# Patient Record
Sex: FEMALE | Race: WHITE | Employment: OTHER | URBAN - METROPOLITAN AREA
[De-identification: names, ages, dates, MRNs, and addresses within clinical notes are randomized per-mention and may not be internally consistent; named-entity substitution may affect disease eponyms.]

---

## 2017-04-06 PROBLEM — L03.115 CELLULITIS OF RIGHT LOWER EXTREMITY: Status: ACTIVE | Noted: 2017-04-06

## 2017-04-06 PROBLEM — L03.90 CELLULITIS: Status: ACTIVE | Noted: 2017-04-06

## 2017-04-06 PROBLEM — I50.9 CHF (CONGESTIVE HEART FAILURE) (HCC): Status: ACTIVE | Noted: 2017-04-06

## 2017-04-06 PROBLEM — A41.9 SEVERE SEPSIS (HCC): Status: ACTIVE | Noted: 2017-04-06

## 2017-04-06 PROBLEM — R65.20 SEVERE SEPSIS (HCC): Status: ACTIVE | Noted: 2017-04-06

## 2017-11-16 ENCOUNTER — HOSPITAL ENCOUNTER (OUTPATIENT)
Dept: MAMMOGRAPHY | Age: 57
Discharge: HOME | End: 2017-11-16
Attending: FAMILY MEDICINE | Admitting: FAMILY MEDICINE

## 2017-11-16 DIAGNOSIS — R92.8 ABNORMAL MAMMOGRAM: ICD-10-CM

## 2017-11-16 DIAGNOSIS — N63.0 LUMP OR MASS IN BREAST: ICD-10-CM

## 2017-11-16 DIAGNOSIS — N63.0 BREAST LUMP: ICD-10-CM

## 2018-01-23 PROBLEM — K43.0 INCARCERATED INCISIONAL HERNIA: Status: ACTIVE | Noted: 2018-01-23

## 2018-01-24 PROBLEM — E11.9 DIABETES (HCC): Status: ACTIVE | Noted: 2018-01-24

## 2018-01-24 PROBLEM — J44.9 COPD (CHRONIC OBSTRUCTIVE PULMONARY DISEASE) (HCC): Status: ACTIVE | Noted: 2018-01-24

## 2018-01-24 PROBLEM — I10 HTN (HYPERTENSION): Status: ACTIVE | Noted: 2018-01-24

## 2018-01-24 PROBLEM — E66.9 OBESITY: Status: ACTIVE | Noted: 2018-01-24

## 2018-02-05 ENCOUNTER — TELEPHONE (OUTPATIENT)
Dept: PULMONOLOGY | Age: 58
End: 2018-02-05

## 2018-02-09 ENCOUNTER — OFFICE VISIT (OUTPATIENT)
Dept: SURGERY | Age: 58
End: 2018-02-09

## 2018-02-09 VITALS
BODY MASS INDEX: 53.92 KG/M2 | HEART RATE: 86 BPM | HEIGHT: 62 IN | DIASTOLIC BLOOD PRESSURE: 85 MMHG | SYSTOLIC BLOOD PRESSURE: 132 MMHG | WEIGHT: 293 LBS

## 2018-02-09 DIAGNOSIS — Z09 POSTOP CHECK: ICD-10-CM

## 2018-02-09 PROCEDURE — 99024 POSTOP FOLLOW-UP VISIT: CPT | Performed by: SURGERY

## 2018-02-20 ENCOUNTER — OFFICE VISIT (OUTPATIENT)
Dept: PULMONOLOGY | Age: 58
End: 2018-02-20

## 2018-02-20 VITALS
RESPIRATION RATE: 18 BRPM | HEART RATE: 72 BPM | OXYGEN SATURATION: 97 % | DIASTOLIC BLOOD PRESSURE: 74 MMHG | SYSTOLIC BLOOD PRESSURE: 161 MMHG | BODY MASS INDEX: 53.92 KG/M2 | TEMPERATURE: 98.1 F | WEIGHT: 293 LBS | HEIGHT: 62 IN

## 2018-02-20 DIAGNOSIS — J45.20 MILD INTERMITTENT ASTHMA WITHOUT COMPLICATION: ICD-10-CM

## 2018-02-20 DIAGNOSIS — E66.01 MORBID OBESITY (HCC): ICD-10-CM

## 2018-02-20 DIAGNOSIS — E66.2 OBESITY HYPOVENTILATION SYNDROME (HCC): ICD-10-CM

## 2018-02-20 DIAGNOSIS — J44.9 CHRONIC OBSTRUCTIVE PULMONARY DISEASE, UNSPECIFIED COPD TYPE (HCC): Primary | ICD-10-CM

## 2018-02-20 DIAGNOSIS — R06.02 SOB (SHORTNESS OF BREATH): ICD-10-CM

## 2018-02-20 DIAGNOSIS — G47.33 OSA (OBSTRUCTIVE SLEEP APNEA): ICD-10-CM

## 2018-02-20 PROCEDURE — 3023F SPIROM DOC REV: CPT | Performed by: INTERNAL MEDICINE

## 2018-02-20 PROCEDURE — 1036F TOBACCO NON-USER: CPT | Performed by: INTERNAL MEDICINE

## 2018-02-20 PROCEDURE — 1111F DSCHRG MED/CURRENT MED MERGE: CPT | Performed by: INTERNAL MEDICINE

## 2018-02-20 PROCEDURE — 3017F COLORECTAL CA SCREEN DOC REV: CPT | Performed by: INTERNAL MEDICINE

## 2018-02-20 PROCEDURE — G8427 DOCREV CUR MEDS BY ELIG CLIN: HCPCS | Performed by: INTERNAL MEDICINE

## 2018-02-20 PROCEDURE — G8484 FLU IMMUNIZE NO ADMIN: HCPCS | Performed by: INTERNAL MEDICINE

## 2018-02-20 PROCEDURE — G8926 SPIRO NO PERF OR DOC: HCPCS | Performed by: INTERNAL MEDICINE

## 2018-02-20 PROCEDURE — G8417 CALC BMI ABV UP PARAM F/U: HCPCS | Performed by: INTERNAL MEDICINE

## 2018-02-20 PROCEDURE — 99214 OFFICE O/P EST MOD 30 MIN: CPT | Performed by: INTERNAL MEDICINE

## 2018-02-20 PROCEDURE — 3014F SCREEN MAMMO DOC REV: CPT | Performed by: INTERNAL MEDICINE

## 2018-02-20 ASSESSMENT — SLEEP AND FATIGUE QUESTIONNAIRES
HOW LIKELY ARE YOU TO NOD OFF OR FALL ASLEEP WHILE WATCHING TV: 3
HOW LIKELY ARE YOU TO NOD OFF OR FALL ASLEEP WHILE SITTING INACTIVE IN A PUBLIC PLACE: 2
HOW LIKELY ARE YOU TO NOD OFF OR FALL ASLEEP WHILE LYING DOWN TO REST IN THE AFTERNOON WHEN CIRCUMSTANCES PERMIT: 2
HOW LIKELY ARE YOU TO NOD OFF OR FALL ASLEEP WHILE SITTING QUIETLY AFTER LUNCH WITHOUT ALCOHOL: 1
HOW LIKELY ARE YOU TO NOD OFF OR FALL ASLEEP WHILE SITTING AND TALKING TO SOMEONE: 1
HOW LIKELY ARE YOU TO NOD OFF OR FALL ASLEEP WHEN YOU ARE A PASSENGER IN A CAR FOR AN HOUR WITHOUT A BREAK: 0
ESS TOTAL SCORE: 12
HOW LIKELY ARE YOU TO NOD OFF OR FALL ASLEEP IN A CAR, WHILE STOPPED FOR A FEW MINUTES IN TRAFFIC: 0
NECK CIRCUMFERENCE (INCHES): 16.25
HOW LIKELY ARE YOU TO NOD OFF OR FALL ASLEEP WHILE SITTING AND READING: 3

## 2018-02-20 ASSESSMENT — ENCOUNTER SYMPTOMS
DIARRHEA: 1
SHORTNESS OF BREATH: 1

## 2018-02-20 NOTE — LETTER
[address] [facility] Pulmonary Critical [facility] Sleep  [address] [place] [address] Drive [address]  Phone: [phone]  Fax: [phone]    Ortega Haywood DO        February 20, 2018       Patient: Michael Garcia   MR Number: [id number]   YOB: 1960   Date of Visit: 2/20/2018       Dear Dr. Chirag Mott: Our mutual patient Michael Garcia was seen in hospital follow-up today. Below are the relevant portions of my assessment and plan of care. If you have questions, please do not hesitate to call me. I look forward to following Merle Lombardo along with you.     Sincerely,        Flex Red MD    CC providers:  Amanda Amaya DO  [address] [place] [address]  VIA [place]

## 2018-02-20 NOTE — PROGRESS NOTES
Pulmonary Outpatient Note   Will Tijerina MD       2/20/2018    1. Chronic obstructive pulmonary disease, unspecified COPD type ([facility]    2. Mild intermittent asthma without complication    3. MIRIAM (obstructive sleep apnea)    4. SOB (shortness of breath)    5. Obesity hypoventilation syndrome ([facility]    6. Morbid obesity ([facility]          ASSESSMENT/PLAN:  1. COPD, mild intermittent asthma. Patient did not have any heavy smoking history, likely has intermittent asthma rather than COPD. Will obtain PFT, CXR.  2.  MIRIAM. I will start out with overnight oximetry. 3.  SOB. Probably from obesity, deconditioning and potentially asthma. Await PFT. Will need to lose weight and increase activity. The patient was informed about this. 4.  Obesity hypoventilation syndrome. She does not have daytime hypoxemia present, but will await overnight oximetry. 5.  Morbid obesity. She will need to cut back calories, have explained to her that exercise alone will not work. Diet was discussed with her, she may need a formal consult with a nutritionist.  6. ?  CHF, aortic stenosis, TR, pulmonary hypertension. Followed by his cardiologist.  His pulmonary hypertension could be from nocturnal desaturation and/or obesity hypoventilation. RTC after tests completed.       Orders Placed This Encounter   Procedures    XR CHEST STANDARD (2 VW)    Pulse oximetry, overnight    FULL PFT STUDY       No Follow-up on file. Chief Complaint:  Follow-Up from Hospital and COPD       HPI: German Patino is a 56y.o. year old female here for follow up. Her PCP is Julian Briones, surgeon Dr. Casandra Taylor. Her cardiologist is Dr. Tristen You. She is accompanied by her Dori Rowe. Beverly Zuniga is a morbidly obese patient with a history of occasional smoking. She was never documented to have COPD. She was seen during her hospitalization between 1/24 and 1/28/18 for hypoxemic and hypercarbic respiratory failure.   She presented with an incarcerated hernia that was operated upon, developed hypoxemia postoperatively. She gradually recovered, was not discharged on home O2. She has seen Dr. Renee Hernandez in follow-up, staples have been removed on 2/9/18. The patient says she did have possible asthma diagnosed about 15 years ago, was on Advair. Presently she is not on any inhalers. She denies cough or wheezing or chest pain. She has no  complaints. She does have increased frequency of bowel movements, semi-formed. The patient was diagnosed to have sleep apnea more than 10 years ago at [facility]. She did not follow through as she could not tolerate CPAP. She thinks she does have the sleep study at home. The patient said initially postoperatively she was drooling, and symptoms have decreased after surgery. She does have exertional dyspnea, mild. She had lost a significant amount of weight with diet and exercise in the past.  She does see a cardiologist    Previous notes reviewed and edited as necessary. Patient is a 56 y.o. female who was admitted with with incarcerated umbilical hernia with small amount obstruction.      This is a morbidly obese 65-year-old female with past medical history of hypertension, asthma, COPD, diabetes mellitus, arthritis and wound infection from MRSA, details being unknown. She was last admitted to [facility] for right leg cellulitis, sepsis. She had been seen by Dr. Jaime Whyte for sepsis related to cellulitis.     The patient underwent CT abdomen and pelvis which confirmed incarceration. An NG was placed and she has kept nothing by mouth. She was prepped and taken to the OR for hernia repair and mesh placement, returned on the ventilator. No family is available, records have been obtained from EMR and nursing.      Echocardiogram 4/6/17:  Left ventricular systolic function is normal with ejection fraction estimated at 55 %. Left ventricular size is moderately increased. There is mild concentric left ventricular

## 2018-03-06 DIAGNOSIS — G47.33 OSA (OBSTRUCTIVE SLEEP APNEA): ICD-10-CM

## 2018-03-06 DIAGNOSIS — J44.9 CHRONIC OBSTRUCTIVE PULMONARY DISEASE, UNSPECIFIED COPD TYPE (HCC): ICD-10-CM

## 2018-03-19 ENCOUNTER — HOSPITAL ENCOUNTER (OUTPATIENT)
Dept: PULMONOLOGY | Age: 58
Discharge: HOME | End: 2018-03-19
Attending: INTERNAL MEDICINE | Admitting: INTERNAL MEDICINE

## 2018-03-19 ENCOUNTER — OFFICE VISIT (OUTPATIENT)
Dept: PULMONOLOGY | Age: 58
End: 2018-03-19

## 2018-03-19 VITALS
OXYGEN SATURATION: 96 % | HEIGHT: 62 IN | SYSTOLIC BLOOD PRESSURE: 130 MMHG | WEIGHT: 293 LBS | RESPIRATION RATE: 18 BRPM | TEMPERATURE: 98 F | HEART RATE: 76 BPM | BODY MASS INDEX: 53.92 KG/M2 | DIASTOLIC BLOOD PRESSURE: 67 MMHG

## 2018-03-19 DIAGNOSIS — E66.01 MORBID OBESITY (HCC): ICD-10-CM

## 2018-03-19 DIAGNOSIS — G47.33 OSA (OBSTRUCTIVE SLEEP APNEA): ICD-10-CM

## 2018-03-19 DIAGNOSIS — J44.9 CHRONIC OBSTRUCTIVE PULMONARY DISEASE (HCC): ICD-10-CM

## 2018-03-19 DIAGNOSIS — J44.9 CHRONIC OBSTRUCTIVE PULMONARY DISEASE, UNSPECIFIED COPD TYPE (HCC): Primary | ICD-10-CM

## 2018-03-19 DIAGNOSIS — E66.2 OBESITY HYPOVENTILATION SYNDROME (HCC): ICD-10-CM

## 2018-03-19 DIAGNOSIS — J45.20 MILD INTERMITTENT ASTHMA WITHOUT COMPLICATION: ICD-10-CM

## 2018-03-19 DIAGNOSIS — J44.9 CHRONIC OBSTRUCTIVE PULMONARY DISEASE, UNSPECIFIED COPD TYPE (HCC): ICD-10-CM

## 2018-03-19 DIAGNOSIS — R06.02 SOB (SHORTNESS OF BREATH): ICD-10-CM

## 2018-03-19 PROCEDURE — G8926 SPIRO NO PERF OR DOC: HCPCS | Performed by: INTERNAL MEDICINE

## 2018-03-19 PROCEDURE — G8417 CALC BMI ABV UP PARAM F/U: HCPCS | Performed by: INTERNAL MEDICINE

## 2018-03-19 PROCEDURE — 3023F SPIROM DOC REV: CPT | Performed by: INTERNAL MEDICINE

## 2018-03-19 PROCEDURE — 3014F SCREEN MAMMO DOC REV: CPT | Performed by: INTERNAL MEDICINE

## 2018-03-19 PROCEDURE — G8482 FLU IMMUNIZE ORDER/ADMIN: HCPCS | Performed by: INTERNAL MEDICINE

## 2018-03-19 PROCEDURE — G8427 DOCREV CUR MEDS BY ELIG CLIN: HCPCS | Performed by: INTERNAL MEDICINE

## 2018-03-19 PROCEDURE — 3017F COLORECTAL CA SCREEN DOC REV: CPT | Performed by: INTERNAL MEDICINE

## 2018-03-19 PROCEDURE — 99213 OFFICE O/P EST LOW 20 MIN: CPT | Performed by: INTERNAL MEDICINE

## 2018-03-19 PROCEDURE — 1036F TOBACCO NON-USER: CPT | Performed by: INTERNAL MEDICINE

## 2018-03-19 RX ORDER — ALBUTEROL SULFATE 90 UG/1
4 AEROSOL, METERED RESPIRATORY (INHALATION) ONCE
Status: COMPLETED | OUTPATIENT
Start: 2018-03-19 | End: 2018-03-19

## 2018-03-19 RX ADMIN — ALBUTEROL SULFATE 4 PUFF: 90 AEROSOL, METERED RESPIRATORY (INHALATION) at 16:39

## 2018-03-19 ASSESSMENT — ENCOUNTER SYMPTOMS
SHORTNESS OF BREATH: 1
DIARRHEA: 1

## 2018-03-19 NOTE — PROGRESS NOTES
back for follow-up as needed. I encouraged her to increase exercise and lose weight. No orders of the defined types were placed in this encounter. No Follow-up on file. Chief Complaint:  Results (CXR, unable to do PFT) and COPD       HPI: Joy Kent is a 56y.o. year old female here for follow up. Her PCP is Cleopatra Padilla, surgeon Dr. Merle Duggan. Her cardiologist is Dr. Jodee Gould. She is accompanied by her Antolin Curry. The patient says she could not do PFT as she is claustrophobic. She did get overnight oximetry done. Today she walked a lot, is tired in front short of breath. She denies any cough or wheezing. CXR was obtained today. The patient's significant other denies apneas and loud snoring. She did see her cardiologist at [facility] on 2/28. She usually sleeps between 10 and midnight, occasionally watches television in bed. She denies much daytime sleepiness at present. Her labs showed anemia with hemoglobin 9.0. PFT 3/19/18  Spirometry shows no obstruction and no evidence of response to bronchodilator. There are borderline criteria for air trapping, otherwise normal lung volumes. Diffusion capacity was normal.    Previous notes reviewed and edited as necessary. Rachana Monroy is a morbidly obese patient with a history of occasional smoking. She was never documented to have COPD. She was seen during her hospitalization between 1/24 and 1/28/18 for hypoxemic and hypercarbic respiratory failure. She presented with an incarcerated hernia that was operated upon, developed hypoxemia postoperatively. She gradually recovered, was not discharged on home O2. She has seen Dr. Jay Mcclain in follow-up, staples have been removed on 2/9/18. The patient says she did have possible asthma diagnosed about 15 years ago, was on Advair. Presently she is not on any inhalers. She denies cough or wheezing or chest pain. She has no  complaints.   She does have increased frequency of bowel

## 2018-04-11 PROBLEM — Z09 POSTOP CHECK: Status: RESOLVED | Noted: 2018-02-09 | Resolved: 2018-04-11

## 2018-09-17 ENCOUNTER — APPOINTMENT (OUTPATIENT)
Dept: GENERAL RADIOLOGY | Age: 58
DRG: 872 | End: 2018-09-17
Payer: MEDICARE

## 2018-09-17 ENCOUNTER — HOSPITAL ENCOUNTER (INPATIENT)
Age: 58
LOS: 9 days | Discharge: HOME | DRG: 872 | End: 2018-09-26
Attending: EMERGENCY MEDICINE | Admitting: INTERNAL MEDICINE
Payer: MEDICARE

## 2018-09-17 DIAGNOSIS — A41.9 SEPTICEMIA (HCC): ICD-10-CM

## 2018-09-17 DIAGNOSIS — L03.115 CELLULITIS OF RIGHT LOWER EXTREMITY: Primary | ICD-10-CM

## 2018-09-17 LAB
A/G RATIO: 0.9 (ref 1.1–2.2)
ALBUMIN SERPL-MCNC: 3.6 G/DL (ref 3.4–5)
ALP BLD-CCNC: 93 U/L (ref 40–129)
ALT SERPL-CCNC: 22 U/L (ref 10–40)
ANION GAP SERPL CALCULATED.3IONS-SCNC: 16 MMOL/L (ref 3–16)
AST SERPL-CCNC: 87 U/L (ref 15–37)
BASOPHILS ABSOLUTE: 0.2 K/UL (ref 0–0.2)
BASOPHILS RELATIVE PERCENT: 0.8 %
BILIRUB SERPL-MCNC: 0.7 MG/DL (ref 0–1)
BUN BLDV-MCNC: 27 MG/DL (ref 7–20)
CALCIUM SERPL-MCNC: 8.5 MG/DL (ref 8.3–10.6)
CHLORIDE BLD-SCNC: 91 MMOL/L (ref 99–110)
CO2: 22 MMOL/L (ref 21–32)
CREAT SERPL-MCNC: 1.6 MG/DL (ref 0.6–1.1)
EOSINOPHILS ABSOLUTE: 0 K/UL (ref 0–0.6)
EOSINOPHILS RELATIVE PERCENT: 0 %
GFR AFRICAN AMERICAN: 40
GFR NON-AFRICAN AMERICAN: 33
GLOBULIN: 4.2 G/DL
GLUCOSE BLD-MCNC: 146 MG/DL (ref 70–99)
HCT VFR BLD CALC: 31.7 % (ref 36–48)
HEMOGLOBIN: 10.1 G/DL (ref 12–16)
LACTIC ACID: 2.7 MMOL/L (ref 0.4–2)
LIPASE: 32 U/L (ref 13–60)
LYMPHOCYTES ABSOLUTE: 0.7 K/UL (ref 1–5.1)
LYMPHOCYTES RELATIVE PERCENT: 3.4 %
MAGNESIUM: 1.5 MG/DL (ref 1.8–2.4)
MCH RBC QN AUTO: 24.3 PG (ref 26–34)
MCHC RBC AUTO-ENTMCNC: 31.9 G/DL (ref 31–36)
MCV RBC AUTO: 76.2 FL (ref 80–100)
MONOCYTES ABSOLUTE: 0.6 K/UL (ref 0–1.3)
MONOCYTES RELATIVE PERCENT: 3 %
NEUTROPHILS ABSOLUTE: 19.3 K/UL (ref 1.7–7.7)
NEUTROPHILS RELATIVE PERCENT: 92.8 %
PDW BLD-RTO: 16.5 % (ref 12.4–15.4)
PLATELET # BLD: 240 K/UL (ref 135–450)
PMV BLD AUTO: 10.3 FL (ref 5–10.5)
POTASSIUM REFLEX MAGNESIUM: 3.3 MMOL/L (ref 3.5–5.1)
RBC # BLD: 4.15 M/UL (ref 4–5.2)
SODIUM BLD-SCNC: 129 MMOL/L (ref 136–145)
TOTAL PROTEIN: 7.8 G/DL (ref 6.4–8.2)
WBC # BLD: 20.8 K/UL (ref 4–11)

## 2018-09-17 PROCEDURE — 99285 EMERGENCY DEPT VISIT HI MDM: CPT

## 2018-09-17 PROCEDURE — 1200000000 HC SEMI PRIVATE

## 2018-09-17 PROCEDURE — S0028 INJECTION, FAMOTIDINE, 20 MG: HCPCS | Performed by: EMERGENCY MEDICINE

## 2018-09-17 PROCEDURE — 71045 X-RAY EXAM CHEST 1 VIEW: CPT

## 2018-09-17 PROCEDURE — 83735 ASSAY OF MAGNESIUM: CPT

## 2018-09-17 PROCEDURE — 2580000003 HC RX 258: Performed by: EMERGENCY MEDICINE

## 2018-09-17 PROCEDURE — 96360 HYDRATION IV INFUSION INIT: CPT

## 2018-09-17 PROCEDURE — 85025 COMPLETE CBC W/AUTO DIFF WBC: CPT

## 2018-09-17 PROCEDURE — 36415 COLL VENOUS BLD VENIPUNCTURE: CPT

## 2018-09-17 PROCEDURE — 83605 ASSAY OF LACTIC ACID: CPT

## 2018-09-17 PROCEDURE — 87040 BLOOD CULTURE FOR BACTERIA: CPT

## 2018-09-17 PROCEDURE — 80053 COMPREHEN METABOLIC PANEL: CPT

## 2018-09-17 PROCEDURE — 83690 ASSAY OF LIPASE: CPT

## 2018-09-17 PROCEDURE — 6360000002 HC RX W HCPCS: Performed by: EMERGENCY MEDICINE

## 2018-09-17 PROCEDURE — 2500000003 HC RX 250 WO HCPCS: Performed by: EMERGENCY MEDICINE

## 2018-09-17 PROCEDURE — 93005 ELECTROCARDIOGRAM TRACING: CPT | Performed by: EMERGENCY MEDICINE

## 2018-09-17 PROCEDURE — 6370000000 HC RX 637 (ALT 250 FOR IP)

## 2018-09-17 RX ORDER — ACETAMINOPHEN 325 MG/1
TABLET ORAL
Status: COMPLETED
Start: 2018-09-17 | End: 2018-09-17

## 2018-09-17 RX ORDER — 0.9 % SODIUM CHLORIDE 0.9 %
1000 INTRAVENOUS SOLUTION INTRAVENOUS ONCE
Status: COMPLETED | OUTPATIENT
Start: 2018-09-17 | End: 2018-09-18

## 2018-09-17 RX ORDER — SODIUM CHLORIDE 9 MG/ML
INJECTION, SOLUTION INTRAVENOUS CONTINUOUS
Status: DISCONTINUED | OUTPATIENT
Start: 2018-09-17 | End: 2018-09-19

## 2018-09-17 RX ORDER — SODIUM CHLORIDE 0.9 % (FLUSH) 0.9 %
10 SYRINGE (ML) INJECTION PRN
Status: DISCONTINUED | OUTPATIENT
Start: 2018-09-17 | End: 2018-09-26 | Stop reason: HOSPADM

## 2018-09-17 RX ORDER — POTASSIUM CHLORIDE 7.45 MG/ML
10 INJECTION INTRAVENOUS PRN
Status: DISCONTINUED | OUTPATIENT
Start: 2018-09-17 | End: 2018-09-26 | Stop reason: HOSPADM

## 2018-09-17 RX ORDER — SODIUM CHLORIDE 0.9 % (FLUSH) 0.9 %
10 SYRINGE (ML) INJECTION EVERY 12 HOURS SCHEDULED
Status: DISCONTINUED | OUTPATIENT
Start: 2018-09-17 | End: 2018-09-26 | Stop reason: HOSPADM

## 2018-09-17 RX ORDER — 0.9 % SODIUM CHLORIDE 0.9 %
1000 INTRAVENOUS SOLUTION INTRAVENOUS ONCE
Status: COMPLETED | OUTPATIENT
Start: 2018-09-17 | End: 2018-09-17

## 2018-09-17 RX ORDER — POTASSIUM CHLORIDE 20 MEQ/1
40 TABLET, EXTENDED RELEASE ORAL PRN
Status: DISCONTINUED | OUTPATIENT
Start: 2018-09-17 | End: 2018-09-26 | Stop reason: HOSPADM

## 2018-09-17 RX ORDER — POTASSIUM CHLORIDE 20MEQ/15ML
40 LIQUID (ML) ORAL PRN
Status: DISCONTINUED | OUTPATIENT
Start: 2018-09-17 | End: 2018-09-26 | Stop reason: HOSPADM

## 2018-09-17 RX ORDER — FAMOTIDINE 20 MG/1
20 TABLET, FILM COATED ORAL 2 TIMES DAILY
Status: DISCONTINUED | OUTPATIENT
Start: 2018-09-17 | End: 2018-09-26 | Stop reason: HOSPADM

## 2018-09-17 RX ORDER — ACETAMINOPHEN 325 MG/1
650 TABLET ORAL EVERY 4 HOURS PRN
Status: DISCONTINUED | OUTPATIENT
Start: 2018-09-17 | End: 2018-09-18

## 2018-09-17 RX ORDER — ONDANSETRON 2 MG/ML
4 INJECTION INTRAMUSCULAR; INTRAVENOUS EVERY 30 MIN PRN
Status: DISCONTINUED | OUTPATIENT
Start: 2018-09-17 | End: 2018-09-17

## 2018-09-17 RX ORDER — MAGNESIUM SULFATE 1 G/100ML
1 INJECTION INTRAVENOUS ONCE
Status: COMPLETED | OUTPATIENT
Start: 2018-09-17 | End: 2018-09-18

## 2018-09-17 RX ADMIN — ACETAMINOPHEN 650 MG: 325 TABLET ORAL at 23:06

## 2018-09-17 RX ADMIN — FAMOTIDINE 20 MG: 10 INJECTION, SOLUTION INTRAVENOUS at 22:06

## 2018-09-17 RX ADMIN — VANCOMYCIN HYDROCHLORIDE 1000 MG: 1 INJECTION, POWDER, LYOPHILIZED, FOR SOLUTION INTRAVENOUS at 22:59

## 2018-09-17 RX ADMIN — SODIUM CHLORIDE 1000 ML: 9 INJECTION, SOLUTION INTRAVENOUS at 23:00

## 2018-09-17 RX ADMIN — ONDANSETRON 4 MG: 2 INJECTION INTRAMUSCULAR; INTRAVENOUS at 22:06

## 2018-09-17 RX ADMIN — SODIUM CHLORIDE 1000 ML: 9 INJECTION, SOLUTION INTRAVENOUS at 22:06

## 2018-09-17 ASSESSMENT — PAIN DESCRIPTION - PAIN TYPE: TYPE: ACUTE PAIN

## 2018-09-17 ASSESSMENT — PAIN DESCRIPTION - LOCATION: LOCATION: LEG

## 2018-09-17 ASSESSMENT — PAIN SCALES - GENERAL
PAINLEVEL_OUTOF10: 10
PAINLEVEL_OUTOF10: 9

## 2018-09-18 LAB
ANION GAP SERPL CALCULATED.3IONS-SCNC: 14 MMOL/L (ref 3–16)
BASOPHILS ABSOLUTE: 0 K/UL (ref 0–0.2)
BASOPHILS RELATIVE PERCENT: 0.2 %
CHLORIDE BLD-SCNC: 98 MMOL/L (ref 99–110)
CO2: 20 MMOL/L (ref 21–32)
EKG ATRIAL RATE: 92 BPM
EKG DIAGNOSIS: NORMAL
EKG P AXIS: 39 DEGREES
EKG P-R INTERVAL: 128 MS
EKG Q-T INTERVAL: 394 MS
EKG QRS DURATION: 98 MS
EKG QTC CALCULATION (BAZETT): 487 MS
EKG R AXIS: 45 DEGREES
EKG T AXIS: 85 DEGREES
EKG VENTRICULAR RATE: 92 BPM
EOSINOPHILS ABSOLUTE: 0 K/UL (ref 0–0.6)
EOSINOPHILS RELATIVE PERCENT: 0 %
GLUCOSE BLD-MCNC: 114 MG/DL (ref 70–99)
GLUCOSE BLD-MCNC: 133 MG/DL (ref 70–99)
GLUCOSE BLD-MCNC: 164 MG/DL (ref 70–99)
HCT VFR BLD CALC: 29.5 % (ref 36–48)
HEMOGLOBIN: 9.2 G/DL (ref 12–16)
LACTIC ACID, SEPSIS: 1.6 MMOL/L (ref 0.4–1.9)
LACTIC ACID, SEPSIS: 1.6 MMOL/L (ref 0.4–1.9)
LYMPHOCYTES ABSOLUTE: 0.8 K/UL (ref 1–5.1)
LYMPHOCYTES RELATIVE PERCENT: 4.2 %
MCH RBC QN AUTO: 24.2 PG (ref 26–34)
MCHC RBC AUTO-ENTMCNC: 31.1 G/DL (ref 31–36)
MCV RBC AUTO: 78.1 FL (ref 80–100)
MONOCYTES ABSOLUTE: 1 K/UL (ref 0–1.3)
MONOCYTES RELATIVE PERCENT: 5.2 %
NEUTROPHILS ABSOLUTE: 17.4 K/UL (ref 1.7–7.7)
NEUTROPHILS RELATIVE PERCENT: 90.4 %
PDW BLD-RTO: 16.2 % (ref 12.4–15.4)
PERFORMED ON: ABNORMAL
PLATELET # BLD: 174 K/UL (ref 135–450)
PMV BLD AUTO: 9.7 FL (ref 5–10.5)
POTASSIUM REFLEX MAGNESIUM: 3.8 MMOL/L (ref 3.5–5.1)
RBC # BLD: 3.78 M/UL (ref 4–5.2)
SODIUM BLD-SCNC: 132 MMOL/L (ref 136–145)
WBC # BLD: 19.2 K/UL (ref 4–11)

## 2018-09-18 PROCEDURE — 6360000002 HC RX W HCPCS: Performed by: INTERNAL MEDICINE

## 2018-09-18 PROCEDURE — 83605 ASSAY OF LACTIC ACID: CPT

## 2018-09-18 PROCEDURE — 93010 ELECTROCARDIOGRAM REPORT: CPT | Performed by: INTERNAL MEDICINE

## 2018-09-18 PROCEDURE — 6370000000 HC RX 637 (ALT 250 FOR IP): Performed by: INTERNAL MEDICINE

## 2018-09-18 PROCEDURE — 2580000003 HC RX 258: Performed by: INTERNAL MEDICINE

## 2018-09-18 PROCEDURE — 36415 COLL VENOUS BLD VENIPUNCTURE: CPT

## 2018-09-18 PROCEDURE — 83036 HEMOGLOBIN GLYCOSYLATED A1C: CPT

## 2018-09-18 PROCEDURE — 99233 SBSQ HOSP IP/OBS HIGH 50: CPT | Performed by: INTERNAL MEDICINE

## 2018-09-18 PROCEDURE — 85025 COMPLETE CBC W/AUTO DIFF WBC: CPT

## 2018-09-18 PROCEDURE — 1200000000 HC SEMI PRIVATE

## 2018-09-18 PROCEDURE — 2580000003 HC RX 258

## 2018-09-18 PROCEDURE — 80051 ELECTROLYTE PANEL: CPT

## 2018-09-18 RX ORDER — ACETAMINOPHEN 500 MG
1000 TABLET ORAL EVERY 6 HOURS PRN
Status: DISCONTINUED | OUTPATIENT
Start: 2018-09-18 | End: 2018-09-26 | Stop reason: HOSPADM

## 2018-09-18 RX ORDER — FERROUS SULFATE 325(65) MG
325 TABLET ORAL 2 TIMES DAILY WITH MEALS
Status: DISCONTINUED | OUTPATIENT
Start: 2018-09-18 | End: 2018-09-26 | Stop reason: HOSPADM

## 2018-09-18 RX ORDER — HYDROCODONE BITARTRATE AND ACETAMINOPHEN 5; 325 MG/1; MG/1
1 TABLET ORAL EVERY 6 HOURS PRN
Status: DISCONTINUED | OUTPATIENT
Start: 2018-09-18 | End: 2018-09-19

## 2018-09-18 RX ORDER — SODIUM CHLORIDE 9 MG/ML
INJECTION, SOLUTION INTRAVENOUS
Status: COMPLETED
Start: 2018-09-18 | End: 2018-09-18

## 2018-09-18 RX ORDER — MORPHINE SULFATE 2 MG/ML
2 INJECTION, SOLUTION INTRAMUSCULAR; INTRAVENOUS EVERY 4 HOURS PRN
Status: DISCONTINUED | OUTPATIENT
Start: 2018-09-18 | End: 2018-09-26 | Stop reason: HOSPADM

## 2018-09-18 RX ADMIN — SODIUM CHLORIDE: 9 INJECTION, SOLUTION INTRAVENOUS at 00:30

## 2018-09-18 RX ADMIN — SODIUM CHLORIDE: 9 INJECTION, SOLUTION INTRAVENOUS at 20:45

## 2018-09-18 RX ADMIN — FAMOTIDINE 20 MG: 20 TABLET ORAL at 09:53

## 2018-09-18 RX ADMIN — VANCOMYCIN HYDROCHLORIDE 500 MG: 500 INJECTION, POWDER, LYOPHILIZED, FOR SOLUTION INTRAVENOUS at 02:18

## 2018-09-18 RX ADMIN — MAGNESIUM SULFATE HEPTAHYDRATE 1 G: 1 INJECTION, SOLUTION INTRAVENOUS at 00:29

## 2018-09-18 RX ADMIN — HYDROCODONE BITARTRATE AND ACETAMINOPHEN 1 TABLET: 5; 325 TABLET ORAL at 20:42

## 2018-09-18 RX ADMIN — Medication 10 ML: at 20:43

## 2018-09-18 RX ADMIN — SODIUM CHLORIDE: 9 INJECTION, SOLUTION INTRAVENOUS at 02:18

## 2018-09-18 RX ADMIN — AMPICILLIN SODIUM AND SULBACTAM SODIUM 1.5 G: 1; .5 INJECTION, POWDER, FOR SOLUTION INTRAMUSCULAR; INTRAVENOUS at 20:46

## 2018-09-18 RX ADMIN — SODIUM CHLORIDE 250 ML: 9 INJECTION, SOLUTION INTRAVENOUS at 02:19

## 2018-09-18 RX ADMIN — FERROUS SULFATE TAB 325 MG (65 MG ELEMENTAL FE) 325 MG: 325 (65 FE) TAB at 18:05

## 2018-09-18 RX ADMIN — AMPICILLIN SODIUM AND SULBACTAM SODIUM 1.5 G: 1; .5 INJECTION, POWDER, FOR SOLUTION INTRAMUSCULAR; INTRAVENOUS at 12:00

## 2018-09-18 RX ADMIN — HYDROCODONE BITARTRATE AND ACETAMINOPHEN 1 TABLET: 5; 325 TABLET ORAL at 11:57

## 2018-09-18 RX ADMIN — AMPICILLIN SODIUM AND SULBACTAM SODIUM 1.5 G: 1; .5 INJECTION, POWDER, FOR SOLUTION INTRAMUSCULAR; INTRAVENOUS at 18:05

## 2018-09-18 RX ADMIN — FAMOTIDINE 20 MG: 20 TABLET ORAL at 20:43

## 2018-09-18 RX ADMIN — ACETAMINOPHEN 1000 MG: 500 TABLET ORAL at 12:52

## 2018-09-18 RX ADMIN — Medication 10 ML: at 00:30

## 2018-09-18 RX ADMIN — ENOXAPARIN SODIUM 40 MG: 40 INJECTION SUBCUTANEOUS at 09:53

## 2018-09-18 RX ADMIN — FAMOTIDINE 20 MG: 20 TABLET ORAL at 00:29

## 2018-09-18 RX ADMIN — FERROUS SULFATE TAB 325 MG (65 MG ELEMENTAL FE) 325 MG: 325 (65 FE) TAB at 09:53

## 2018-09-18 ASSESSMENT — PAIN SCALES - GENERAL
PAINLEVEL_OUTOF10: 6
PAINLEVEL_OUTOF10: 0
PAINLEVEL_OUTOF10: 6
PAINLEVEL_OUTOF10: 0

## 2018-09-18 ASSESSMENT — PAIN DESCRIPTION - LOCATION
LOCATION: LEG
LOCATION: LEG

## 2018-09-18 ASSESSMENT — PAIN DESCRIPTION - PAIN TYPE
TYPE: ACUTE PAIN
TYPE: ACUTE PAIN

## 2018-09-18 ASSESSMENT — PAIN DESCRIPTION - FREQUENCY: FREQUENCY: INTERMITTENT

## 2018-09-18 ASSESSMENT — PAIN DESCRIPTION - DESCRIPTORS: DESCRIPTORS: ACHING

## 2018-09-18 NOTE — ED PROVIDER NOTES
[address] ED  eMERGENCY dEPARTMENT eNCOUnter        Pt Name: Eric Yoder  MRN: [id number]  [place] 1960  Date of evaluation: 9/17/2018  Provider: Bhaskar Jonas MD  PCP: Sourav Gordon       Chief Complaint   Patient presents with    Emesis     pt states that \"I have a infection in my legs; my body\"; c/o of N/V; pt right leg and foot are swollen       HISTORY OF PRESENT ILLNESS   (Location/Symptom, Timing/Onset, Context/Setting, Quality, Duration, Modifying Factors, Severity)  Note limiting factors. Eric Yoder is a 66 y.o. female  Presents for evaluation of infection in her legs and she feels like it's in the rest of her body she complains of nausea and vomiting right leg is swollen and red    Nursing Notes were all reviewed and agreed with or any disagreements were addressed  in the HPI. REVIEW OF SYSTEMS    (2-9 systems for level 4, 10 or more for level 5)     Review of Systems    Positives and Pertinent negatives as per HPI. Except as noted above in the ROS, all other systems were reviewed and negative. PAST MEDICAL HISTORY     Past Medical History:   Diagnosis Date    Arthritis     Asthma     [facility] obstructive [facility] disease) [facility]     Diabetes mellitus ([facility]     Hypertension     Infection of wound due to methicillin resistant Staphylococcus aureus (MRSA)          SURGICAL HISTORY       Past Surgical History:   Procedure Laterality Date    DILATION AND CURETTAGE OF UTERUS      HERNIA REPAIR  2005    TONSILLECTOMY           CURRENT MEDICATIONS       Previous Medications    FUROSEMIDE (LASIX) 20 MG TABLET    Take 20 mg by mouth daily    TRIAMCINOLONE (ARISTOCORT) 0.5 % OINTMENT    Apply topically 2 times daily as needed Apply topically 2 times daily. ALLERGIES     Asa [aspirin]    FAMILY HISTORY     History reviewed. No pertinent family history.        SOCIAL HISTORY       Social History     Social History    Marital status:      Spouse name: N/A    Number of children: N/A    Years of education: N/A     Social History Main Topics    Smoking status: Former Smoker     Packs/day: 0.25     Years: 3.00     Types: Cigarettes    Smokeless tobacco: Never Used      Comment: 2014    Alcohol use Yes      Comment: rarely    Drug use: No    Sexual activity: Not Asked     Other Topics Concern    None     Social History Narrative    None       SCREENINGS    [place] Coma Scale  Eye Opening: Spontaneous  Best Verbal Response: Oriented  Best Motor Response: Obeys commands  [place] Coma Scale Score: 15        PHYSICAL EXAM    (up to 7 for level 4, 8 or more for level 5)     ED Triage Vitals [09/17/18 2137]   BP Temp Temp Source Pulse Resp SpO2 Height Weight   121/68 100.7 °F (38.2 °C) Oral 93 22 98 % 5' 3\" (1.6 m) (!) 380 lb (172.4 kg)       Physical Exam      General Appearance:  Alert, cooperative, no distress, appears stated age. Head:  Normocephalic, without obvious abnormality, atraumatic. Eyes:  conjunctiva/corneas clear, EOM's intact. Sclera anicteric. ENT: Mucous membranes moist.   Neck: Supple, symmetrical, trachea midline, no adenopathy. No jugular venous distention. Lungs:   Clear to auscultation bilaterally, respirations unlabored. No rales, rhonchi or wheezes. Chest Wall:  No tenderness. Heart:  Regular rate and rhythm, S1 and S2 normal, no murmur, rub or gallop. Abdomen:   Soft, non-tender, bowel sounds active,   no masses, no organomegaly. Extremities: Lipedema bilaterally right leg is erythematous and swollen and warm to touch. Pulses: 2+ and symmetric   Skin: Turgor is normal, no rashes or lesions. Neurologic: Alert and oriented X 3. No focal findings.   Motor grossly normal.  Speech clear, no drift, CN III-XII grossly intact,        DIAGNOSTIC RESULTS   LABS:    Labs Reviewed   CBC WITH AUTO DIFFERENTIAL - Abnormal; Notable for the following:        Result Value    WBC 20.8 (*)

## 2018-09-18 NOTE — ED NOTES
Chief Complaint   Patient presents with    Emesis     pt states that \"I have a infection in my legs; my body\"; c/o of N/V; pt right leg and foot are swollen     Pt placed in room 7 in gown. Placed on cardiac monitor, ST noted. Pt placed on hospital bed upon arrival to ed. Bed in low position, call light in reach. Family at bedside. Will continue to monitor.       Imelda Avila RN  09/17/18 2507

## 2018-09-18 NOTE — PROGRESS NOTES
Alert and oriented x 4. Respirations easy and even. No respiratory distress noted. Pulse regular and non-bounding. Abdomen soft and non-tender. Peripheral pulses present. Patient given hygiene/bed bath by PCA, repositioned in bed. Temp 100.0 oral.monitoring. AM meds given.  Patient denies further needs at this time

## 2018-09-18 NOTE — CONSULTS
Pharmacy Note  Vancomycin Consult    Solange Dee is a 66 y.o. female started on Vancomycin for sepsis/cellulitis ; consult received from Dr. Pooja Goyal to manage therapy. Also receiving the following antibiotics: . Patient Active Problem List   Diagnosis    Cellulitis of right lower extremity    Severe sepsis (HCC)    Congestive heart failure (HCC)    Fever    Incarcerated incisional hernia    Class 3 obesity due to excess calories with serious comorbidity and body mass index (BMI) of 60.0 to 69.9 in adult ([facility]    HTN (hypertension)    Diabetes ([facility]    COPD (chronic obstructive pulmonary disease) ([facility]    Acute postoperative respiratory insufficiency    Aortic valve stenosis    Microcytic anemia    Pulmonary hypertension ([facility]    Morbid obesity ([facility]    MIRIAM (obstructive sleep apnea)    Sepsis [facility]       Allergies:  Asa [aspirin]     Temp max:     Recent Labs      09/17/18   2205   BUN  27*       Recent Labs      09/17/18   2205   CREATININE  1.6*       Recent Labs      09/17/18   2205   WBC  20.8*       No intake or output data in the 24 hours ending 09/17/18 2353    Culture Date      Source                       Results      Ht Readings from Last 1 Encounters:   09/17/18 5' 3\" (1.6 m)        Wt Readings from Last 1 Encounters:   09/17/18 (!) 385 lb 9.6 oz (174.9 kg)         Body mass index is 68.31 kg/m². Estimated Creatinine Clearance: 63 mL/min (A) (based on SCr of 1.6 mg/dL (H)). Goal Trough Level: 15-20 mcg/mL    Assessment/Plan:  Patient received vancomycin 1000mg ivpb x1 dose in ER at 2259 on 9-17-18. Please give additional vancomycin 500mg ivpb x1 dose now (for a total of 1500mg this evening). Then, start vancomycin 1500mg ivpb q24h on 9-19-18 at 0100. Please check vancomycin trough 9-21-18 at [facility]. Thank you for the consult. Will continue to follow. [facility]. Ph.  9/17/2018 11:55 PM

## 2018-09-18 NOTE — H&P
Hypokalemia   MIRIAM     PLAN:  Follow blood cultures   Continue IV vancomycin, pharm to dose  IV fluids, hold diuretics   Replete electrolytes   Reevaluate in the AM.     DVT Prophylaxis: Lovenox   Diet: DIET LOW FAT;  Code Status: Full Code    Dispo - Admit to tele. Thank you Delia Oden for the opportunity to be involved in this patient's care.

## 2018-09-18 NOTE — PLAN OF CARE
Problem: SAFETY  Goal: Free from accidental physical injury  Outcome: Ongoing      Problem: DAILY CARE  Goal: Daily care needs are met  Outcome: Ongoing      Problem: PAIN  Goal: Patient's pain/discomfort is manageable  Outcome: Ongoing      Problem: SKIN INTEGRITY  Goal: Skin integrity is maintained or improved  Outcome: Ongoing      Problem: KNOWLEDGE DEFICIT  Goal: Patient/S.O. demonstrates understanding of disease process, treatment plan, medications, and discharge instructions.   Outcome: Ongoing      Problem: DISCHARGE BARRIERS  Goal: Patient's continuum of care needs are met  Outcome: Ongoing

## 2018-09-18 NOTE — FLOWSHEET NOTE
09/18/18 1430   Vital Signs   Temp 99.1 °F (37.3 °C)   Temp Source Oral   Pulse 76   Heart Rate Source Monitor   Resp 20   BP (!) 100/59   BP Location Right lower arm   BP Upper/Lower Lower   Patient Position Semi fowlers   Level of Consciousness 0   MEWS Score 2   Oxygen Therapy   SpO2 94 %   O2 Device None (Room air)

## 2018-09-18 NOTE — PROGRESS NOTES
IM Progress Note    Admit Date:  9/17/2018  1    Interval history:  Admitted for cellulitis of right leg   High fevers, BP stable s      Subjective:  Ms. Galina Roldan seen up in bed in painful distres. Reports right leg pain , not given any pain meds since last night      Objective:   /73   Pulse 93   Temp 101.4 °F (38.6 °C) (Axillary)   Resp 20   Ht 5' 3\" (1.6 m)   Wt (!) 394 lb 9.6 oz (179 kg)   LMP 09/10/2018   SpO2 98%   BMI 69.90 kg/m²   No intake or output data in the 24 hours ending 09/18/18 0809    Physical Exam:        General: elderly female morbidly obese,   Awake, alert and oriented. Appears to be not in painful distress  Mucous Membranes:  Pink , anicteric  Neck: No JVD, no carotid bruit, no thyromegaly  Chest:  Clear to auscultation bilaterally, no added sounds- diminished  Cardiovascular:  RRR S1S2 heard, no murmurs or gallops  Abdomen:  Soft, morbidly obese,  undistended, non tender, no organomegaly, BS present  Extremities:diffuse ariella pedal edema , abd wall edema with large pannus and intertriginous dermatitis . Severe redeness and warmth to right leg consistent with cellulitis  2+ edema ariella .  Distal pulses well felt  Neurological : grossly normal with mild distress          Medications:   Scheduled Medications:    sodium chloride flush  10 mL Intravenous 2 times per day    enoxaparin  40 mg Subcutaneous Daily    famotidine  20 mg Oral BID    [START ON 9/19/2018] vancomycin  1,500 mg Intravenous Q24H     I   sodium chloride 125 mL/hr at 09/18/18 6883     acetaminophen, sodium chloride flush, potassium chloride **OR** potassium chloride **OR** potassium chloride    Lab Data:  Recent Labs      09/17/18 2205 09/18/18   0542   WBC  20.8*  19.2*   HGB  10.1*  9.2*   HCT  31.7*  29.5*   MCV  76.2*  78.1*   PLT  240  174     Recent Labs      09/17/18 2205 09/18/18   0542   NA  129*  132*   K  3.3*  3.8   CL  91*  98*   CO2  22  20*   BUN  27*   --    CREATININE  1.6*   --      No

## 2018-09-19 LAB
AMORPHOUS: ABNORMAL /HPF
ANION GAP SERPL CALCULATED.3IONS-SCNC: 11 MMOL/L (ref 3–16)
BACTERIA: ABNORMAL /HPF
BASOPHILS ABSOLUTE: 0.1 K/UL (ref 0–0.2)
BASOPHILS RELATIVE PERCENT: 0.7 %
BILIRUBIN URINE: NEGATIVE
BLOOD, URINE: ABNORMAL
BUN BLDV-MCNC: 24 MG/DL (ref 7–20)
CALCIUM SERPL-MCNC: 7.8 MG/DL (ref 8.3–10.6)
CASTS 2: ABNORMAL /LPF
CHLORIDE BLD-SCNC: 99 MMOL/L (ref 99–110)
CLARITY: CLEAR
CO2: 23 MMOL/L (ref 21–32)
COLOR: ABNORMAL
CREAT SERPL-MCNC: 1.4 MG/DL (ref 0.6–1.1)
EOSINOPHILS ABSOLUTE: 0 K/UL (ref 0–0.6)
EOSINOPHILS RELATIVE PERCENT: 0.4 %
EPITHELIAL CELLS, UA: ABNORMAL /HPF
ESTIMATED AVERAGE GLUCOSE: 131.2 MG/DL
GFR AFRICAN AMERICAN: 47
GFR NON-AFRICAN AMERICAN: 39
GLUCOSE BLD-MCNC: 125 MG/DL (ref 70–99)
GLUCOSE BLD-MCNC: 131 MG/DL (ref 70–99)
GLUCOSE BLD-MCNC: 137 MG/DL (ref 70–99)
GLUCOSE BLD-MCNC: 172 MG/DL (ref 70–99)
GLUCOSE BLD-MCNC: 182 MG/DL (ref 70–99)
GLUCOSE URINE: NEGATIVE MG/DL
HBA1C MFR BLD: 6.2 %
HCT VFR BLD CALC: 25.9 % (ref 36–48)
HEMOGLOBIN: 8.2 G/DL (ref 12–16)
KETONES, URINE: NEGATIVE MG/DL
LEUKOCYTE ESTERASE, URINE: NEGATIVE
LYMPHOCYTES ABSOLUTE: 0.7 K/UL (ref 1–5.1)
LYMPHOCYTES RELATIVE PERCENT: 5.6 %
MAGNESIUM: 2 MG/DL (ref 1.8–2.4)
MCH RBC QN AUTO: 24.3 PG (ref 26–34)
MCHC RBC AUTO-ENTMCNC: 31.8 G/DL (ref 31–36)
MCV RBC AUTO: 76.5 FL (ref 80–100)
MICROSCOPIC EXAMINATION: YES
MONOCYTES ABSOLUTE: 0.9 K/UL (ref 0–1.3)
MONOCYTES RELATIVE PERCENT: 7 %
NEUTROPHILS ABSOLUTE: 11.1 K/UL (ref 1.7–7.7)
NEUTROPHILS RELATIVE PERCENT: 86.3 %
NITRITE, URINE: NEGATIVE
PDW BLD-RTO: 16 % (ref 12.4–15.4)
PERFORMED ON: ABNORMAL
PH UA: 5.5
PLATELET # BLD: 146 K/UL (ref 135–450)
PMV BLD AUTO: 10 FL (ref 5–10.5)
POTASSIUM REFLEX MAGNESIUM: 3.8 MMOL/L (ref 3.5–5.1)
POTASSIUM SERPL-SCNC: 3.8 MMOL/L (ref 3.5–5.1)
PROTEIN UA: NEGATIVE MG/DL
RBC # BLD: 3.39 M/UL (ref 4–5.2)
RBC UA: ABNORMAL /HPF (ref 0–2)
SODIUM BLD-SCNC: 133 MMOL/L (ref 136–145)
SPECIFIC GRAVITY UA: 1.01
URINE REFLEX TO CULTURE: ABNORMAL
URINE TYPE: ABNORMAL
UROBILINOGEN, URINE: 0.2 E.U./DL
WBC # BLD: 12.8 K/UL (ref 4–11)
WBC UA: ABNORMAL /HPF (ref 0–5)

## 2018-09-19 PROCEDURE — 2580000003 HC RX 258: Performed by: INTERNAL MEDICINE

## 2018-09-19 PROCEDURE — 80048 BASIC METABOLIC PNL TOTAL CA: CPT

## 2018-09-19 PROCEDURE — 6370000000 HC RX 637 (ALT 250 FOR IP): Performed by: INTERNAL MEDICINE

## 2018-09-19 PROCEDURE — 1200000000 HC SEMI PRIVATE

## 2018-09-19 PROCEDURE — 2580000003 HC RX 258

## 2018-09-19 PROCEDURE — 6360000002 HC RX W HCPCS: Performed by: INTERNAL MEDICINE

## 2018-09-19 PROCEDURE — 99232 SBSQ HOSP IP/OBS MODERATE 35: CPT | Performed by: INTERNAL MEDICINE

## 2018-09-19 PROCEDURE — 85025 COMPLETE CBC W/AUTO DIFF WBC: CPT

## 2018-09-19 PROCEDURE — 36415 COLL VENOUS BLD VENIPUNCTURE: CPT

## 2018-09-19 PROCEDURE — 83735 ASSAY OF MAGNESIUM: CPT

## 2018-09-19 PROCEDURE — 80051 ELECTROLYTE PANEL: CPT

## 2018-09-19 PROCEDURE — 81001 URINALYSIS AUTO W/SCOPE: CPT

## 2018-09-19 RX ORDER — FUROSEMIDE 10 MG/ML
20 INJECTION INTRAMUSCULAR; INTRAVENOUS ONCE
Status: COMPLETED | OUTPATIENT
Start: 2018-09-19 | End: 2018-09-19

## 2018-09-19 RX ORDER — ONDANSETRON 4 MG/1
4 TABLET, ORALLY DISINTEGRATING ORAL EVERY 6 HOURS PRN
Status: DISCONTINUED | OUTPATIENT
Start: 2018-09-19 | End: 2018-09-26 | Stop reason: HOSPADM

## 2018-09-19 RX ORDER — SODIUM CHLORIDE 9 MG/ML
INJECTION, SOLUTION INTRAVENOUS
Status: COMPLETED
Start: 2018-09-19 | End: 2018-09-19

## 2018-09-19 RX ORDER — HYDROCODONE BITARTRATE AND ACETAMINOPHEN 5; 325 MG/1; MG/1
1 TABLET ORAL EVERY 4 HOURS PRN
Status: DISCONTINUED | OUTPATIENT
Start: 2018-09-19 | End: 2018-09-26 | Stop reason: HOSPADM

## 2018-09-19 RX ADMIN — ENOXAPARIN SODIUM 40 MG: 40 INJECTION SUBCUTANEOUS at 09:01

## 2018-09-19 RX ADMIN — MORPHINE SULFATE 2 MG: 2 INJECTION, SOLUTION INTRAMUSCULAR; INTRAVENOUS at 11:26

## 2018-09-19 RX ADMIN — Medication 1.5 G: at 02:23

## 2018-09-19 RX ADMIN — Medication 10 ML: at 09:01

## 2018-09-19 RX ADMIN — FAMOTIDINE 20 MG: 20 TABLET ORAL at 20:32

## 2018-09-19 RX ADMIN — SODIUM CHLORIDE 250 ML: 9 INJECTION, SOLUTION INTRAVENOUS at 20:42

## 2018-09-19 RX ADMIN — Medication 10 ML: at 20:32

## 2018-09-19 RX ADMIN — FERROUS SULFATE TAB 325 MG (65 MG ELEMENTAL FE) 325 MG: 325 (65 FE) TAB at 09:01

## 2018-09-19 RX ADMIN — Medication 10 ML: at 17:34

## 2018-09-19 RX ADMIN — HYDROCODONE BITARTRATE AND ACETAMINOPHEN 1 TABLET: 5; 325 TABLET ORAL at 17:35

## 2018-09-19 RX ADMIN — FUROSEMIDE 20 MG: 10 INJECTION, SOLUTION INTRAMUSCULAR; INTRAVENOUS at 14:30

## 2018-09-19 RX ADMIN — ACETAMINOPHEN 1000 MG: 500 TABLET ORAL at 17:34

## 2018-09-19 RX ADMIN — AMPICILLIN SODIUM AND SULBACTAM SODIUM 1.5 G: 1; .5 INJECTION, POWDER, FOR SOLUTION INTRAMUSCULAR; INTRAVENOUS at 17:35

## 2018-09-19 RX ADMIN — AMPICILLIN SODIUM AND SULBACTAM SODIUM 1.5 G: 1; .5 INJECTION, POWDER, FOR SOLUTION INTRAMUSCULAR; INTRAVENOUS at 05:19

## 2018-09-19 RX ADMIN — FERROUS SULFATE TAB 325 MG (65 MG ELEMENTAL FE) 325 MG: 325 (65 FE) TAB at 17:35

## 2018-09-19 RX ADMIN — AMPICILLIN SODIUM AND SULBACTAM SODIUM 1.5 G: 1; .5 INJECTION, POWDER, FOR SOLUTION INTRAMUSCULAR; INTRAVENOUS at 10:16

## 2018-09-19 RX ADMIN — AMPICILLIN SODIUM AND SULBACTAM SODIUM 1.5 G: 1; .5 INJECTION, POWDER, FOR SOLUTION INTRAMUSCULAR; INTRAVENOUS at 20:32

## 2018-09-19 RX ADMIN — HYDROCODONE BITARTRATE AND ACETAMINOPHEN 1 TABLET: 5; 325 TABLET ORAL at 09:01

## 2018-09-19 RX ADMIN — FAMOTIDINE 20 MG: 20 TABLET ORAL at 09:00

## 2018-09-19 RX ADMIN — SODIUM CHLORIDE: 9 INJECTION, SOLUTION INTRAVENOUS at 10:11

## 2018-09-19 RX ADMIN — HYDROCODONE BITARTRATE AND ACETAMINOPHEN 1 TABLET: 5; 325 TABLET ORAL at 22:36

## 2018-09-19 ASSESSMENT — PAIN DESCRIPTION - PAIN TYPE
TYPE: ACUTE PAIN

## 2018-09-19 ASSESSMENT — PAIN DESCRIPTION - ONSET
ONSET: ON-GOING

## 2018-09-19 ASSESSMENT — PAIN DESCRIPTION - DESCRIPTORS
DESCRIPTORS: ACHING;SORE

## 2018-09-19 ASSESSMENT — PAIN SCALES - GENERAL
PAINLEVEL_OUTOF10: 3
PAINLEVEL_OUTOF10: 0
PAINLEVEL_OUTOF10: 5
PAINLEVEL_OUTOF10: 10
PAINLEVEL_OUTOF10: 10

## 2018-09-19 ASSESSMENT — PAIN DESCRIPTION - LOCATION
LOCATION: LEG

## 2018-09-19 ASSESSMENT — PAIN DESCRIPTION - FREQUENCY
FREQUENCY: CONTINUOUS
FREQUENCY: CONTINUOUS
FREQUENCY: INTERMITTENT

## 2018-09-19 ASSESSMENT — PAIN DESCRIPTION - ORIENTATION
ORIENTATION: RIGHT

## 2018-09-19 ASSESSMENT — PAIN DESCRIPTION - PROGRESSION
CLINICAL_PROGRESSION: NOT CHANGED
CLINICAL_PROGRESSION: NOT CHANGED

## 2018-09-19 NOTE — FLOWSHEET NOTE
09/19/18 0726   Vital Signs   Temp 100.4 °F (38 °C)   Temp Source Axillary   Pulse 88   Heart Rate Source Monitor   Resp 19   /70   BP Location Right upper arm   BP Upper/Lower Upper   Patient Position Semi fowlers   Level of Consciousness 0   MEWS Score 1   Oxygen Therapy   SpO2 98 %   O2 Device None (Room air)   AM assessment completed, see flow sheet. Pt is alert and oriented. Vital signs are WNL. Respirations are even & easy. C/o pain to RLE, PRN norco given. Pt denies needs at this time. SR up x 2, and bed in low position. Call light is within reach. Will monitor.

## 2018-09-19 NOTE — CARE COORDINATION
INTERDISCIPLINARY PLAN OF CARE CONFERENCE    Date/Time: 9/19/2018 11:15 AM  Completed by: Juliette Schroeder Case Management      Patient Name:  Amilcar Mata  YOB: 1960  Admitting Diagnosis: Sepsis [facility]) [A41.9]     Admit Date/Time:  9/17/2018  9:30 PM    Chart reviewed. Interdisciplinary team met to discuss patient progress and discharge plans. Disciplines included Case Management, Nursing, and Dietitian. Current Status: Inpatient 9/17/2018    Anticipated Discharge Date: TBD  Expected D/C Disposition:  Home  Confirmed plan with patient and/or family Yes  Discharge Plan Comments: Chart reviewed. Met with pt at bedside and explained the role of the CM. Plan: Lives w/ SO and plans to return home. Denies any HC or DME needs.      Home O2 in place on admit: No  Pt informed of need to bring portable home O2 tank on day of discharge for nursing to connect prior to leaving:  No  Verbalized agreement/Understanding:  No

## 2018-09-19 NOTE — PROGRESS NOTES
Pt resting in bed at this time. No distress noted. All needs and call light within reach. Will monitor.

## 2018-09-19 NOTE — FLOWSHEET NOTE
09/19/18 0726   Vital Signs   Temp 100.4 °F (38 °C)   Temp Source Axillary   Pulse 88   Heart Rate Source Monitor   Resp 19   /70   BP Location Right upper arm   BP Upper/Lower Upper   Patient Position Semi fowlers   Level of Consciousness 0   MEWS Score 1   Oxygen Therapy   SpO2 98 %   O2 Device None (Room air)   AM assessment completed, see flow sheet. Pt is alert and oriented. Vital signs are WNL. Respirations are even & easy. RLE warm and swollen. No drainage noted. C/o pain to leg, PRN norco given. Pt denies further needs at this time. SR up x 2, and bed in low position. Call light is within reach. Will monitor.

## 2018-09-19 NOTE — PROGRESS NOTES
IM Progress Note    Admit Date:  9/17/2018  2    Interval history:  Admitted for cellulitis of right leg   Improved  fevers, BP stable    Subjective:  Ms. Delia Kent feels some better today. Pain better controlled but not gone , worried about sob     Objective:   /73   Pulse 78   Temp 98 °F (36.7 °C) (Axillary)   Resp 18   Ht 5' 3\" (1.6 m)   Wt (!) 397 lb 11.2 oz (180.4 kg)   LMP 09/10/2018   SpO2 93%   BMI 70.45 kg/m²       Intake/Output Summary (Last 24 hours) at 09/19/18 1137  Last data filed at 09/19/18 1124   Gross per 24 hour   Intake             2454 ml   Output                0 ml   Net             2454 ml     Physical Exam:    General: elderly female morbidly obese,   Awake, alert and oriented. Mucous Membranes:  Pink , anicteric  Neck: No JVD, no carotid bruit, no thyromegaly  Chest:  Clear to auscultation bilaterally, no added sounds- diminished  Cardiovascular:  RRR S1S2 heard, no murmurs or gallops  Abdomen:  Soft, morbidly obese,  undistended, non tender, no organomegaly, BS present  Extremities:diffuse ariella pedal edema , abd wall edema with large pannus and intertriginous dermatitis . improving redeness and warmth to right leg   2+ edema ariella .  Distal pulses well felt  Neurological : grossly normal       Medications:   Scheduled Medications:    ampicillin-sulbactam  1.5 g Intravenous Q6H    ferrous sulfate  325 mg Oral BID WC    sodium chloride flush  10 mL Intravenous 2 times per day    enoxaparin  40 mg Subcutaneous Daily    famotidine  20 mg Oral BID    vancomycin  1,500 mg Intravenous Q24H     I   sodium chloride 125 mL/hr at 09/19/18 1011     acetaminophen, morphine, HYDROcodone 5 mg - acetaminophen, sodium chloride flush, potassium chloride **OR** potassium chloride **OR** potassium chloride    Lab Data:  Recent Labs      09/17/18   2205  09/18/18   0542  09/19/18   0623   WBC  20.8*  19.2*  12.8*   HGB  10.1*  9.2*  8.2*   HCT  31.7*  29.5*  25.9*   MCV  76.2*  78.1*  76.5* PLT  240  174  146     Recent Labs      09/17/18   2205  09/18/18   0542  09/19/18   0623   NA  129*  132*  133*   K  3.3*  3.8  3.8  3.8   CL  91*  98*  99   CO2  22  20*  23   BUN  27*   --   24*   CREATININE  1.6*   --   1.4*     Cardiac markers:   Lab Results   Component Value Date    TROPONINI <0.01 04/05/2017     Lab Results   Component Value Date    ALT 22 09/17/2018    AST 87 (H) 09/17/2018    ALKPHOS 93 09/17/2018    BILITOT 0.7 09/17/2018     Cultures:     Blood cx x2: NGTD     Radiology:     ASSESSMENT:    Sepsis  Cellulitis of RLE   - with leukocytosis, lactic acidosis, fever, tachypnea and tachycardia   - given vanc in Er  - Admit to Med Surg  - monitor vitals and labs; blood cx x2: NGTD  - on  unasyn, IVF (lactate improving), morphine PRN  - improved lactate with IVF, improving fevers. - consider right LE venous doppler       Acute kidney injury   - suspect with sepsis  - hold home lasix. Continue IVF  - monitor: 1.6 --> 1.4    Severe leucocytosis   - 20.8 > 19 > 12.8  - monitor with IV abx    Elevated A1c  - 6.2  - fasting sugars stable  - Monitor    Hyponatremia   Hypomagnesemia - Resolved  Hypokalemia - Resolved     -Replete electrolytes  - Na 133, Mg WNL today, K: 3.8      Microcytic anemia     - iron def on labs earlier this year  - Add oral iron studies    MIRIAM   Morbid Obesity  - Body mass index is 70.45 kg/m². - Complicating assessment and treatment. Placing patient at risk for multiple co-morbidities as well as early death and contributing to the patient's presentation.   - Counseled on weight loss.     DVT Prophylaxis: Lovenox   Diet: DIET LOW FAT;  Code Status: Full Code    Annemarie Orozco MD 9/19/2018 2:07 PM

## 2018-09-20 LAB
ANION GAP SERPL CALCULATED.3IONS-SCNC: 11 MMOL/L (ref 3–16)
BASOPHILS ABSOLUTE: 0.1 K/UL (ref 0–0.2)
BASOPHILS RELATIVE PERCENT: 0.5 %
BUN BLDV-MCNC: 18 MG/DL (ref 7–20)
CALCIUM SERPL-MCNC: 8.2 MG/DL (ref 8.3–10.6)
CHLORIDE BLD-SCNC: 98 MMOL/L (ref 99–110)
CO2: 24 MMOL/L (ref 21–32)
CREAT SERPL-MCNC: 1.2 MG/DL (ref 0.6–1.1)
EOSINOPHILS ABSOLUTE: 0.1 K/UL (ref 0–0.6)
EOSINOPHILS RELATIVE PERCENT: 0.6 %
GFR AFRICAN AMERICAN: 56
GFR NON-AFRICAN AMERICAN: 46
GLUCOSE BLD-MCNC: 135 MG/DL (ref 70–99)
GLUCOSE BLD-MCNC: 144 MG/DL (ref 70–99)
GLUCOSE BLD-MCNC: 176 MG/DL (ref 70–99)
GLUCOSE BLD-MCNC: 180 MG/DL (ref 70–99)
GLUCOSE BLD-MCNC: 188 MG/DL (ref 70–99)
HCT VFR BLD CALC: 25 % (ref 36–48)
HEMOGLOBIN: 7.9 G/DL (ref 12–16)
LYMPHOCYTES ABSOLUTE: 0.9 K/UL (ref 1–5.1)
LYMPHOCYTES RELATIVE PERCENT: 6.6 %
MCH RBC QN AUTO: 24.4 PG (ref 26–34)
MCHC RBC AUTO-ENTMCNC: 31.6 G/DL (ref 31–36)
MCV RBC AUTO: 77.2 FL (ref 80–100)
MONOCYTES ABSOLUTE: 1.4 K/UL (ref 0–1.3)
MONOCYTES RELATIVE PERCENT: 10.4 %
NEUTROPHILS ABSOLUTE: 11 K/UL (ref 1.7–7.7)
NEUTROPHILS RELATIVE PERCENT: 81.9 %
PDW BLD-RTO: 16.2 % (ref 12.4–15.4)
PERFORMED ON: ABNORMAL
PLATELET # BLD: 157 K/UL (ref 135–450)
PMV BLD AUTO: 10.2 FL (ref 5–10.5)
POTASSIUM REFLEX MAGNESIUM: 3.8 MMOL/L (ref 3.5–5.1)
POTASSIUM SERPL-SCNC: 3.8 MMOL/L (ref 3.5–5.1)
RBC # BLD: 3.24 M/UL (ref 4–5.2)
SODIUM BLD-SCNC: 133 MMOL/L (ref 136–145)
WBC # BLD: 13.5 K/UL (ref 4–11)

## 2018-09-20 PROCEDURE — 83036 HEMOGLOBIN GLYCOSYLATED A1C: CPT

## 2018-09-20 PROCEDURE — 6370000000 HC RX 637 (ALT 250 FOR IP): Performed by: INTERNAL MEDICINE

## 2018-09-20 PROCEDURE — 97535 SELF CARE MNGMENT TRAINING: CPT

## 2018-09-20 PROCEDURE — 97161 PT EVAL LOW COMPLEX 20 MIN: CPT

## 2018-09-20 PROCEDURE — G8979 MOBILITY GOAL STATUS: HCPCS

## 2018-09-20 PROCEDURE — 2580000003 HC RX 258: Performed by: INTERNAL MEDICINE

## 2018-09-20 PROCEDURE — 97166 OT EVAL MOD COMPLEX 45 MIN: CPT

## 2018-09-20 PROCEDURE — G8987 SELF CARE CURRENT STATUS: HCPCS

## 2018-09-20 PROCEDURE — 1200000000 HC SEMI PRIVATE

## 2018-09-20 PROCEDURE — G8978 MOBILITY CURRENT STATUS: HCPCS

## 2018-09-20 PROCEDURE — 85025 COMPLETE CBC W/AUTO DIFF WBC: CPT

## 2018-09-20 PROCEDURE — 97530 THERAPEUTIC ACTIVITIES: CPT

## 2018-09-20 PROCEDURE — 36415 COLL VENOUS BLD VENIPUNCTURE: CPT

## 2018-09-20 PROCEDURE — 80048 BASIC METABOLIC PNL TOTAL CA: CPT

## 2018-09-20 PROCEDURE — 97116 GAIT TRAINING THERAPY: CPT

## 2018-09-20 PROCEDURE — G8988 SELF CARE GOAL STATUS: HCPCS

## 2018-09-20 PROCEDURE — 6360000002 HC RX W HCPCS: Performed by: INTERNAL MEDICINE

## 2018-09-20 PROCEDURE — 80051 ELECTROLYTE PANEL: CPT

## 2018-09-20 PROCEDURE — 99232 SBSQ HOSP IP/OBS MODERATE 35: CPT | Performed by: INTERNAL MEDICINE

## 2018-09-20 RX ORDER — FUROSEMIDE 10 MG/ML
20 INJECTION INTRAMUSCULAR; INTRAVENOUS ONCE
Status: COMPLETED | OUTPATIENT
Start: 2018-09-20 | End: 2018-09-20

## 2018-09-20 RX ORDER — DEXTROSE MONOHYDRATE 50 MG/ML
100 INJECTION, SOLUTION INTRAVENOUS PRN
Status: DISCONTINUED | OUTPATIENT
Start: 2018-09-20 | End: 2018-09-25 | Stop reason: SDUPTHER

## 2018-09-20 RX ORDER — DEXTROSE MONOHYDRATE 25 G/50ML
12.5 INJECTION, SOLUTION INTRAVENOUS PRN
Status: DISCONTINUED | OUTPATIENT
Start: 2018-09-20 | End: 2018-09-25 | Stop reason: SDUPTHER

## 2018-09-20 RX ORDER — NICOTINE POLACRILEX 4 MG
15 LOZENGE BUCCAL PRN
Status: DISCONTINUED | OUTPATIENT
Start: 2018-09-20 | End: 2018-09-25 | Stop reason: SDUPTHER

## 2018-09-20 RX ORDER — KETOROLAC TROMETHAMINE 30 MG/ML
15 INJECTION, SOLUTION INTRAMUSCULAR; INTRAVENOUS ONCE
Status: COMPLETED | OUTPATIENT
Start: 2018-09-20 | End: 2018-09-20

## 2018-09-20 RX ADMIN — Medication 1.5 G: at 01:29

## 2018-09-20 RX ADMIN — FUROSEMIDE 20 MG: 10 INJECTION, SOLUTION INTRAMUSCULAR; INTRAVENOUS at 14:27

## 2018-09-20 RX ADMIN — INSULIN LISPRO 1 UNITS: 100 INJECTION, SOLUTION INTRAVENOUS; SUBCUTANEOUS at 12:52

## 2018-09-20 RX ADMIN — FERROUS SULFATE TAB 325 MG (65 MG ELEMENTAL FE) 325 MG: 325 (65 FE) TAB at 16:22

## 2018-09-20 RX ADMIN — AMPICILLIN SODIUM AND SULBACTAM SODIUM 1.5 G: 1; .5 INJECTION, POWDER, FOR SOLUTION INTRAMUSCULAR; INTRAVENOUS at 21:11

## 2018-09-20 RX ADMIN — Medication 10 ML: at 09:04

## 2018-09-20 RX ADMIN — ACETAMINOPHEN 1000 MG: 500 TABLET ORAL at 09:12

## 2018-09-20 RX ADMIN — FAMOTIDINE 20 MG: 20 TABLET ORAL at 21:11

## 2018-09-20 RX ADMIN — HYDROCODONE BITARTRATE AND ACETAMINOPHEN 1 TABLET: 5; 325 TABLET ORAL at 16:22

## 2018-09-20 RX ADMIN — KETOROLAC TROMETHAMINE 15 MG: 30 INJECTION, SOLUTION INTRAMUSCULAR at 14:27

## 2018-09-20 RX ADMIN — INSULIN LISPRO 1 UNITS: 100 INJECTION, SOLUTION INTRAVENOUS; SUBCUTANEOUS at 21:17

## 2018-09-20 RX ADMIN — Medication 10 ML: at 08:49

## 2018-09-20 RX ADMIN — FERROUS SULFATE TAB 325 MG (65 MG ELEMENTAL FE) 325 MG: 325 (65 FE) TAB at 09:04

## 2018-09-20 RX ADMIN — AMPICILLIN SODIUM AND SULBACTAM SODIUM 1.5 G: 1; .5 INJECTION, POWDER, FOR SOLUTION INTRAMUSCULAR; INTRAVENOUS at 16:22

## 2018-09-20 RX ADMIN — AMPICILLIN SODIUM AND SULBACTAM SODIUM 1.5 G: 1; .5 INJECTION, POWDER, FOR SOLUTION INTRAMUSCULAR; INTRAVENOUS at 11:10

## 2018-09-20 RX ADMIN — FAMOTIDINE 20 MG: 20 TABLET ORAL at 09:04

## 2018-09-20 RX ADMIN — Medication 10 ML: at 14:27

## 2018-09-20 RX ADMIN — HYDROCODONE BITARTRATE AND ACETAMINOPHEN 1 TABLET: 5; 325 TABLET ORAL at 12:07

## 2018-09-20 RX ADMIN — AMPICILLIN SODIUM AND SULBACTAM SODIUM 1.5 G: 1; .5 INJECTION, POWDER, FOR SOLUTION INTRAMUSCULAR; INTRAVENOUS at 04:17

## 2018-09-20 RX ADMIN — ENOXAPARIN SODIUM 40 MG: 40 INJECTION SUBCUTANEOUS at 09:04

## 2018-09-20 RX ADMIN — Medication 10 ML: at 21:11

## 2018-09-20 RX ADMIN — INSULIN LISPRO 1 UNITS: 100 INJECTION, SOLUTION INTRAVENOUS; SUBCUTANEOUS at 16:23

## 2018-09-20 ASSESSMENT — PAIN DESCRIPTION - FREQUENCY
FREQUENCY: CONTINUOUS

## 2018-09-20 ASSESSMENT — PAIN DESCRIPTION - ONSET
ONSET: ON-GOING

## 2018-09-20 ASSESSMENT — PAIN SCALES - GENERAL
PAINLEVEL_OUTOF10: 6
PAINLEVEL_OUTOF10: 9
PAINLEVEL_OUTOF10: 10
PAINLEVEL_OUTOF10: 10

## 2018-09-20 ASSESSMENT — PAIN DESCRIPTION - PROGRESSION
CLINICAL_PROGRESSION: NOT CHANGED
CLINICAL_PROGRESSION: GRADUALLY WORSENING

## 2018-09-20 ASSESSMENT — PAIN DESCRIPTION - ORIENTATION
ORIENTATION: RIGHT

## 2018-09-20 ASSESSMENT — PAIN DESCRIPTION - LOCATION
LOCATION: LEG

## 2018-09-20 ASSESSMENT — PAIN DESCRIPTION - PAIN TYPE: TYPE: ACUTE PAIN

## 2018-09-20 NOTE — PROGRESS NOTES
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 Decreased to 75 mg daily on 1/3/18. Primary oncologist (Dr. 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 Continue to monitor.   Sit to stand: CGA from EOB, BSC (over toilet)  Stand to sit: CGA to EOB, BSC (over toilet)     Gait    Ambulated 15' + 25' with RW and CGA. Gait deviations included: slow jesse, CORRAL    Activity Tolerance   SpO2: 92% on RA completing ADLs   87% upon return to bed, requiring 1 min rest to recover to 90%  Fatigued, demonstrating CORRAL    Positioning Needs   Pt reclined in bed, call light and needs in reach; no alarm in place. Patient/Family Education   Educated on role of PT, DC recommendations, POC, and safety awareness   G-CODEs:  PT G-Codes  Functional Assessment Tool Used: AM-PAC  Score: 17  Functional Limitation: Mobility: Walking and moving around  Mobility: Walking and Moving Around Current Status (): At least 40 percent but less than 60 percent impaired, limited or restricted  Mobility: Walking and Moving Around Goal Status (): At least 20 percent but less than 40 percent impaired, limited or restricted      Assessment of Deficits  Pt demonstrated decreased activity tolerance, decreased safety awareness, decreased balance, and decreased independence with bed mobility, transfers and gait. Recommending pt dc to SNF when appropriate as she is functioning below her baseline with moderate CORRAL. Pt. Limited during evaluation by fatigue, CORRAL  At end of evaluation, pt. In bed, with call light and needs within reach. Goal(s) : To be met in 3 visits:  1). Independent with LE Ex x 10 reps    To be met in 6 visits:  1). Supine to/from sit with mod I  2). Sit to/from stand with supervision   3). Bed to chair with RW and supervision   4). Gait: ambulate 48' with RW and SBA  5). Tolerate B LE exercises 10 reps  6). Up and Down steps x 1 with RW and SBA  7). Maintain O2 sats >/=90% with all activity     Rehabilitation Potential   Good for goals listed above.     Strengths for achieving goals include: PLOF, cooperation   Barriers to achieving goals include: fatigue, CORRAL    Plan    To be seen 5x/week while in acute care setting for therapeutic exercises, bed mobility, transfers, progressive gait training, balance training, and family/patient education. Timed Code Treatment Minutes: 30 minutes  Total Treatment Time:  46 minutes    Mario Alberto Bacon PT, DPT #[id number]     If patient discharges from this facility prior to next visit, this note will serve as the Discharge Summary.

## 2018-09-20 NOTE — PLAN OF CARE
Problem: Falls - Risk of:  Goal: Will remain free from falls  Will remain free from falls   Outcome: Ongoing    Goal: Absence of physical injury  Absence of physical injury   Outcome: Ongoing      Problem: Risk for Impaired Skin Integrity  Goal: Tissue integrity - skin and mucous membranes  Structural intactness and normal physiological function of skin and  mucous membranes. Outcome: Ongoing      Problem: SAFETY  Goal: Free from accidental physical injury  Outcome: Ongoing      Problem: DAILY CARE  Goal: Daily care needs are met  Outcome: Ongoing      Problem: PAIN  Goal: Patient's pain/discomfort is manageable  Outcome: Ongoing      Problem: SKIN INTEGRITY  Goal: Skin integrity is maintained or improved  Outcome: Ongoing      Problem: KNOWLEDGE DEFICIT  Goal: Patient/S.O. demonstrates understanding of disease process, treatment plan, medications, and discharge instructions.   Outcome: Ongoing      Problem: DISCHARGE BARRIERS  Goal: Patient's continuum of care needs are met  Outcome: Ongoing

## 2018-09-20 NOTE — PROGRESS NOTES
Inpatient Occupational Therapy  Evaluation and Treatment    Unit: 2West   Date:  2018  Patient Name:    Patricio Enrique  Admitting diagnosis:  Sepsis [facility]) [A41.9]  Admit Date:  2018  Precautions/Restrictions/WB Status/ Lines/ Wounds/ Oxygen:fall risk   Treatment Time:  950-1025  Treatment Number:  after ambulation to the BR the pts SPo2 was at 92%. After ambulation back to bed prs Spo2 was at 87% and pt was SOB. Instructed in pursed lip breathing. Patient Goals for Therapy:  \"  Go home  \"    Discharge Recommendations: SNF   AM-PAC Score:  14    DME needs for discharge:none if pt goes to SNF      Preadmission Environment:    Pt. Lives with SO who is home all the time with pt   Home environment:   Two  story home - pt stays on the first floor  Steps to enter first floor:one plus one  One step up to bedroom, one step to enter bathroom   Bath: first floor  tub shower  Equipment owned:cane, walker, shower seat- does not use, hand held shower, reacher     Preadmission Status:  Pt. Able to drive   Pt. Had assistance from family / and self  with cooking, cleaning, laundry ind with selfcare. Pt. Fully independent for transfers and gait and walked with walker when needed     Pain:    Ratin/10   Location:LEs   Requesting Pain Med? Nursing notified of patients pain level. Cognition:    A&Ox4  Follows 1 step and 2 step commands. Upper Extremity ROM:    WFL, pt able to perform all bed mobility, transfers, and gait without ROM limitation. Upper Extremity Strength:    WFL, pt able to perform all bed mobility, transfers, and gait without strength limitation. Upper Extremity Sensation:    No apparent deficits. Upper Extremity Proprioception:    No apparent deficits.     Coordination and Tone:    WFL  Balance:    Static Sitting:WFL  Dynamic Sitting:impaired  Static Standing:impaired   Dynamic Standing:impaired   Bed mobility:    Supine to sit: min assist   Sit to supine:min assist for LEs   Scooting

## 2018-09-20 NOTE — PROGRESS NOTES
77.2*   PLT  174  146  157     Recent Labs      09/17/18   2205  09/18/18   0542  09/19/18   0623  09/20/18   0625   NA  129*  132*  133*  133*   K  3.3*  3.8  3.8  3.8  3.8   CL  91*  98*  99  98*   CO2  22  20*  23  24   BUN  27*   --   24*  18   CREATININE  1.6*   --   1.4*  1.2*     No results for input(s): CKTOTAL, CKMB, CKMBINDEX, TROPONINI in the last 72 hours. Coagulation: No results found for: INR, APTT  Cardiac markers:   Lab Results   Component Value Date    TROPONINI <0.01 04/05/2017         Lab Results   Component Value Date    ALT 22 09/17/2018    AST 87 (H) 09/17/2018    ALKPHOS 93 09/17/2018    BILITOT 0.7 09/17/2018             Cultures:   pending        XR CHEST PORTABLE   Final Result   Cardial-pericardial silhouette enlargement. Differential considerations   include pericardial effusion and cardiomyopathy.       Pulmonary vascular congestion.               Blood cx x2: NGTD      Radiology:      ASSESSMENT:     Sepsis  Cellulitis of RLE   - with leukocytosis, lactic acidosis, fever, tachypnea and tachycardia   - Admit to Med Surg  - monitor vitals and labs; blood cx x2: NGTD  - on  unasyn, vanc  S.p IVF   - improved lactate with IVF, improving fevers. - pain control.         Acute kidney injury   - suspect with sepsis  - improved with IVF  - monitor: 1.6 --> 1.2     Severe leucocytosis   - 20.8 > 19 > 13  - monitor with IV abx     Elevated A1c  - 6.2  - fasting sugars stable  - Monitor     Hyponatremia   Hypomagnesemia - Resolved  Hypokalemia - Resolved      -Replete electrolytes  - Na 133, Mg WNL today, K: 3.8       Microcytic anemia      - iron def on labs earlier this year  - Add oral iron studies     MIRIAM   Morbid Obesity  - Body mass index is 70.45 kg/m². - Complicating assessment and treatment.  Placing patient at risk for multiple co-morbidities as well as early death and contributing to the patient's presentation.   - Counseled on weight loss.     DVT Prophylaxis: Lovenox   Diet:

## 2018-09-21 LAB
ANION GAP SERPL CALCULATED.3IONS-SCNC: 13 MMOL/L (ref 3–16)
ANISOCYTOSIS: ABNORMAL
BANDED NEUTROPHILS RELATIVE PERCENT: 3 % (ref 0–7)
BASOPHILS ABSOLUTE: 0.4 K/UL (ref 0–0.2)
BASOPHILS RELATIVE PERCENT: 3 %
BUN BLDV-MCNC: 17 MG/DL (ref 7–20)
CALCIUM SERPL-MCNC: 8.7 MG/DL (ref 8.3–10.6)
CHLORIDE BLD-SCNC: 96 MMOL/L (ref 99–110)
CO2: 26 MMOL/L (ref 21–32)
CREAT SERPL-MCNC: 1.1 MG/DL (ref 0.6–1.1)
EOSINOPHILS ABSOLUTE: 0.3 K/UL (ref 0–0.6)
EOSINOPHILS RELATIVE PERCENT: 2 %
ESTIMATED AVERAGE GLUCOSE: 137 MG/DL
GFR AFRICAN AMERICAN: >60
GFR NON-AFRICAN AMERICAN: 51
GLUCOSE BLD-MCNC: 147 MG/DL (ref 70–99)
GLUCOSE BLD-MCNC: 154 MG/DL (ref 70–99)
GLUCOSE BLD-MCNC: 157 MG/DL (ref 70–99)
GLUCOSE BLD-MCNC: 171 MG/DL (ref 70–99)
GLUCOSE BLD-MCNC: 174 MG/DL (ref 70–99)
HBA1C MFR BLD: 6.4 %
HCT VFR BLD CALC: 26.4 % (ref 36–48)
HEMOGLOBIN: 8.3 G/DL (ref 12–16)
LYMPHOCYTES ABSOLUTE: 0.5 K/UL (ref 1–5.1)
LYMPHOCYTES RELATIVE PERCENT: 4 %
MAGNESIUM: 2.2 MG/DL (ref 1.8–2.4)
MCH RBC QN AUTO: 23.9 PG (ref 26–34)
MCHC RBC AUTO-ENTMCNC: 31.5 G/DL (ref 31–36)
MCV RBC AUTO: 76 FL (ref 80–100)
MONOCYTES ABSOLUTE: 0.5 K/UL (ref 0–1.3)
MONOCYTES RELATIVE PERCENT: 4 %
NEUTROPHILS ABSOLUTE: 11.5 K/UL (ref 1.7–7.7)
NEUTROPHILS RELATIVE PERCENT: 84 %
PDW BLD-RTO: 16.4 % (ref 12.4–15.4)
PERFORMED ON: ABNORMAL
PLATELET # BLD: 229 K/UL (ref 135–450)
PLATELET SLIDE REVIEW: ADEQUATE
PMV BLD AUTO: 10.1 FL (ref 5–10.5)
POTASSIUM REFLEX MAGNESIUM: 3.4 MMOL/L (ref 3.5–5.1)
POTASSIUM SERPL-SCNC: 3.4 MMOL/L (ref 3.5–5.1)
RBC # BLD: 3.48 M/UL (ref 4–5.2)
SLIDE REVIEW: ABNORMAL
SODIUM BLD-SCNC: 135 MMOL/L (ref 136–145)
VANCOMYCIN TROUGH: 7.7 UG/ML (ref 10–20)
WBC # BLD: 13.2 K/UL (ref 4–11)

## 2018-09-21 PROCEDURE — 80048 BASIC METABOLIC PNL TOTAL CA: CPT

## 2018-09-21 PROCEDURE — 80051 ELECTROLYTE PANEL: CPT

## 2018-09-21 PROCEDURE — 1200000000 HC SEMI PRIVATE

## 2018-09-21 PROCEDURE — 93971 EXTREMITY STUDY: CPT

## 2018-09-21 PROCEDURE — 36415 COLL VENOUS BLD VENIPUNCTURE: CPT

## 2018-09-21 PROCEDURE — 6370000000 HC RX 637 (ALT 250 FOR IP): Performed by: INTERNAL MEDICINE

## 2018-09-21 PROCEDURE — 6360000002 HC RX W HCPCS: Performed by: INTERNAL MEDICINE

## 2018-09-21 PROCEDURE — 83735 ASSAY OF MAGNESIUM: CPT

## 2018-09-21 PROCEDURE — 2580000003 HC RX 258: Performed by: INTERNAL MEDICINE

## 2018-09-21 PROCEDURE — 2580000003 HC RX 258

## 2018-09-21 PROCEDURE — 99232 SBSQ HOSP IP/OBS MODERATE 35: CPT | Performed by: INTERNAL MEDICINE

## 2018-09-21 PROCEDURE — 97530 THERAPEUTIC ACTIVITIES: CPT

## 2018-09-21 PROCEDURE — 85025 COMPLETE CBC W/AUTO DIFF WBC: CPT

## 2018-09-21 PROCEDURE — 80202 ASSAY OF VANCOMYCIN: CPT

## 2018-09-21 RX ORDER — FUROSEMIDE 10 MG/ML
20 INJECTION INTRAMUSCULAR; INTRAVENOUS 2 TIMES DAILY
Status: DISCONTINUED | OUTPATIENT
Start: 2018-09-21 | End: 2018-09-22

## 2018-09-21 RX ORDER — SODIUM CHLORIDE 9 MG/ML
INJECTION, SOLUTION INTRAVENOUS
Status: COMPLETED
Start: 2018-09-21 | End: 2018-09-21

## 2018-09-21 RX ORDER — DEXTROSE MONOHYDRATE 25 G/50ML
12.5 INJECTION, SOLUTION INTRAVENOUS PRN
Status: DISCONTINUED | OUTPATIENT
Start: 2018-09-21 | End: 2018-09-26 | Stop reason: HOSPADM

## 2018-09-21 RX ORDER — DEXTROSE MONOHYDRATE 50 MG/ML
100 INJECTION, SOLUTION INTRAVENOUS PRN
Status: DISCONTINUED | OUTPATIENT
Start: 2018-09-21 | End: 2018-09-26 | Stop reason: HOSPADM

## 2018-09-21 RX ORDER — NICOTINE POLACRILEX 4 MG
15 LOZENGE BUCCAL PRN
Status: DISCONTINUED | OUTPATIENT
Start: 2018-09-21 | End: 2018-09-26 | Stop reason: HOSPADM

## 2018-09-21 RX ADMIN — Medication 1.5 G: at 01:22

## 2018-09-21 RX ADMIN — HYDROCODONE BITARTRATE AND ACETAMINOPHEN 1 TABLET: 5; 325 TABLET ORAL at 18:31

## 2018-09-21 RX ADMIN — HYDROCODONE BITARTRATE AND ACETAMINOPHEN 1 TABLET: 5; 325 TABLET ORAL at 09:08

## 2018-09-21 RX ADMIN — Medication 10 ML: at 09:00

## 2018-09-21 RX ADMIN — SODIUM CHLORIDE 250 ML: 9 INJECTION, SOLUTION INTRAVENOUS at 10:50

## 2018-09-21 RX ADMIN — HYDROCODONE BITARTRATE AND ACETAMINOPHEN 1 TABLET: 5; 325 TABLET ORAL at 14:07

## 2018-09-21 RX ADMIN — Medication 10 ML: at 17:00

## 2018-09-21 RX ADMIN — POTASSIUM CHLORIDE 40 MEQ: 20 TABLET, EXTENDED RELEASE ORAL at 17:00

## 2018-09-21 RX ADMIN — Medication 1.5 G: at 13:54

## 2018-09-21 RX ADMIN — FUROSEMIDE 20 MG: 10 INJECTION, SOLUTION INTRAMUSCULAR; INTRAVENOUS at 09:08

## 2018-09-21 RX ADMIN — AMPICILLIN SODIUM AND SULBACTAM SODIUM 1.5 G: 1; .5 INJECTION, POWDER, FOR SOLUTION INTRAMUSCULAR; INTRAVENOUS at 21:12

## 2018-09-21 RX ADMIN — Medication 10 ML: at 21:10

## 2018-09-21 RX ADMIN — INSULIN LISPRO 1 UNITS: 100 INJECTION, SOLUTION INTRAVENOUS; SUBCUTANEOUS at 09:01

## 2018-09-21 RX ADMIN — FAMOTIDINE 20 MG: 20 TABLET ORAL at 21:10

## 2018-09-21 RX ADMIN — FERROUS SULFATE TAB 325 MG (65 MG ELEMENTAL FE) 325 MG: 325 (65 FE) TAB at 09:00

## 2018-09-21 RX ADMIN — AMPICILLIN SODIUM AND SULBACTAM SODIUM 1.5 G: 1; .5 INJECTION, POWDER, FOR SOLUTION INTRAMUSCULAR; INTRAVENOUS at 10:50

## 2018-09-21 RX ADMIN — INSULIN LISPRO 1 UNITS: 100 INJECTION, SOLUTION INTRAVENOUS; SUBCUTANEOUS at 16:19

## 2018-09-21 RX ADMIN — FERROUS SULFATE TAB 325 MG (65 MG ELEMENTAL FE) 325 MG: 325 (65 FE) TAB at 16:20

## 2018-09-21 RX ADMIN — AMPICILLIN SODIUM AND SULBACTAM SODIUM 1.5 G: 1; .5 INJECTION, POWDER, FOR SOLUTION INTRAMUSCULAR; INTRAVENOUS at 04:58

## 2018-09-21 RX ADMIN — INSULIN LISPRO 1 UNITS: 100 INJECTION, SOLUTION INTRAVENOUS; SUBCUTANEOUS at 21:12

## 2018-09-21 RX ADMIN — FUROSEMIDE 20 MG: 10 INJECTION, SOLUTION INTRAMUSCULAR; INTRAVENOUS at 17:00

## 2018-09-21 RX ADMIN — HYDROCODONE BITARTRATE AND ACETAMINOPHEN 1 TABLET: 5; 325 TABLET ORAL at 21:58

## 2018-09-21 RX ADMIN — INSULIN LISPRO 1 UNITS: 100 INJECTION, SOLUTION INTRAVENOUS; SUBCUTANEOUS at 14:07

## 2018-09-21 RX ADMIN — AMPICILLIN SODIUM AND SULBACTAM SODIUM 1.5 G: 1; .5 INJECTION, POWDER, FOR SOLUTION INTRAMUSCULAR; INTRAVENOUS at 16:20

## 2018-09-21 RX ADMIN — FAMOTIDINE 20 MG: 20 TABLET ORAL at 09:00

## 2018-09-21 RX ADMIN — ENOXAPARIN SODIUM 40 MG: 40 INJECTION SUBCUTANEOUS at 08:59

## 2018-09-21 ASSESSMENT — PAIN DESCRIPTION - ONSET
ONSET: ON-GOING
ONSET: AWAKENED FROM SLEEP
ONSET: ON-GOING
ONSET: ON-GOING

## 2018-09-21 ASSESSMENT — PAIN DESCRIPTION - LOCATION
LOCATION: LEG

## 2018-09-21 ASSESSMENT — PAIN DESCRIPTION - FREQUENCY
FREQUENCY: CONTINUOUS

## 2018-09-21 ASSESSMENT — PAIN SCALES - GENERAL
PAINLEVEL_OUTOF10: 6
PAINLEVEL_OUTOF10: 8
PAINLEVEL_OUTOF10: 10
PAINLEVEL_OUTOF10: 7

## 2018-09-21 ASSESSMENT — PAIN DESCRIPTION - PAIN TYPE
TYPE: ACUTE PAIN

## 2018-09-21 ASSESSMENT — PAIN DESCRIPTION - DESCRIPTORS: DESCRIPTORS: ACHING

## 2018-09-21 ASSESSMENT — PAIN DESCRIPTION - PROGRESSION
CLINICAL_PROGRESSION: NOT CHANGED
CLINICAL_PROGRESSION: GRADUALLY WORSENING

## 2018-09-21 ASSESSMENT — PAIN DESCRIPTION - ORIENTATION
ORIENTATION: RIGHT

## 2018-09-21 NOTE — PROGRESS NOTES
Pt c/o pain 7/10 in RLE. PRN Norco given per orders. Patient sitting up in chair. RLE rewrapped with new Kerlix. Will monitor.

## 2018-09-21 NOTE — PLAN OF CARE
Problem: Falls - Risk of:  Goal: Will remain free from falls  Will remain free from falls   Outcome: Ongoing      Problem: SAFETY  Goal: Free from accidental physical injury  Outcome: Ongoing      Problem: DAILY CARE  Goal: Daily care needs are met  Outcome: Ongoing      Problem: PAIN  Goal: Patient's pain/discomfort is manageable  Outcome: Ongoing

## 2018-09-21 NOTE — PROGRESS NOTES
Occupational Therapy  Attempted to see patient this pm.  Patient refused, stating she has been awake since 2 am and is \"markell wore out. \"  Verbalized understanding of benefits of OOB and states she has been up several times today. Will continue to follow.   Radha June, OTR/L [id number]

## 2018-09-21 NOTE — PROGRESS NOTES
Bedside report given to [facility] RN pt in stable condition no needs at this time.  Call light within reach

## 2018-09-21 NOTE — PROGRESS NOTES
New Kerlix applied to RLE. PRN Norco given for pain 10/10 in RLE.  at lunch time. Patient out of room for procedure. 1 unit of Insulin given late. Patient now resting in bed. Will monitor.

## 2018-09-21 NOTE — PROGRESS NOTES
Vancomycin trough on 1500 mg IV q24h = 7.7 mcg/ml. Will increase dose to 1500 mg IV q12h. Next dose due @ 1300. A new vancomycin trough has been ordered for 9/22 @ 1230.

## 2018-09-21 NOTE — PROGRESS NOTES
visits:  1). Independent with LE Ex x 10 reps Met per pt report      To be met in 6 visits:  1). Supine to/from sit with mod I - Not met, continue to work toward goal   2). Sit to/from stand with supervision - Not met, continue to work toward goal   3). Bed to chair with RW and supervision - Not met, continue to work toward goal   4). Gait: ambulate 48' with RW and SBA - Not met, continue to work toward goal   5). Tolerate B LE exercises 10 reps - Not met, continue to work toward goal   6). Up and Down steps x 1 with RW and SBA - Not met, continue to work toward goal   7). Maintain O2 sats >/=90% with all activity - Met, continue for consistency      Plan   Continue with plan of care. At end of treatment, patient in chair, alarm active,   with call light and needs within reach. Time Coded Treatment Minutes:   17 minutes    Total Treatment Time:  17 minutes    Evelyn Palmer, PT, DPT #[id number]     If patient discharges from this facility prior to next visit, this note will serve as the Discharge Summary.

## 2018-09-21 NOTE — CARE COORDINATION
INTERDISCIPLINARY PLAN OF CARE CONFERENCE    Date/Time: 9/21/2018 4:22 PM  Completed by: Brooklynn Wade, Case Management      Patient Name:  Liban Berry  YOB: 1960  Admitting Diagnosis: Sepsis ([address] [A41.9]     Admit Date/Time:  9/17/2018  9:30 PM    Chart reviewed. Interdisciplinary team met to discuss patient progress and discharge plans. Disciplines included Case Management, Nursing, and Dietitian. Current Status: Stable    Anticipated Discharge Date: TBD  Expected D/C Disposition:  Home  Confirmed plan with patient and/or family Yes  Discharge Plan Comments:  Reviewed chart and met with pt to discuss dc plan. Pt cont plan for home. Discussed HHC and pt cont to decline. States S.O. Will assist needed. Will cont to follow for dc needs.       Home O2 in place on admit: No  Pt informed of need to bring portable home O2 tank on day of discharge for nursing to connect prior to leaving:  Not Indicated  Verbalized agreement/Understanding:  Not Indicated

## 2018-09-21 NOTE — PROGRESS NOTES
IM Progress Note    Admit Date:  9/17/2018  4    Interval history:  Admitted for cellulitis of right leg   No fevers   BP stable       Subjective:  Ms. Dania Quinn seen up in chair today  Reports pain is better but leg is still swollen       Objective:   BP (!) 131/90   Pulse 69   Temp 98.4 °F (36.9 °C) (Oral)   Resp 18   Ht 5' 3\" (1.6 m)   Wt (!) 402 lb 4.8 oz (182.5 kg)   LMP 09/10/2018   SpO2 93%   BMI 71.26 kg/m²       Intake/Output Summary (Last 24 hours) at 09/21/18 0722  Last data filed at 09/20/18 1203   Gross per 24 hour   Intake              480 ml   Output                0 ml   Net              480 ml       Physical Exam:        General: elderly female morbidly obese,   Awake, alert and oriented. Mucous Membranes:  Pink , anicteric  Neck: No JVD, no carotid bruit, no thyromegaly  Chest:  Clear to auscultation bilaterally, occasional wheeze  Cardiovascular:  RRR S1S2 heard, no murmurs or gallops  Abdomen:  Soft, morbidly obese,  undistended, non tender, no organomegaly, BS present  Extremities:diffuse ariella pedal edema , abd wall edema with large pannus and intertriginous dermatitis . improving redeness and warmth to right leg but edema remains  2+ edema ariella .  Distal pulses well felt  Neurological : grossly normal              Medications:   Scheduled Medications:    vancomycin  1,500 mg Intravenous Q12H    insulin lispro  0-6 Units Subcutaneous TID WC    insulin lispro  0-3 Units Subcutaneous Nightly    ampicillin-sulbactam  1.5 g Intravenous Q6H    ferrous sulfate  325 mg Oral BID WC    sodium chloride flush  10 mL Intravenous 2 times per day    enoxaparin  40 mg Subcutaneous Daily    famotidine  20 mg Oral BID     I   dextrose       glucose, dextrose, glucagon (rDNA), dextrose, HYDROcodone 5 mg - acetaminophen, ondansetron, acetaminophen, morphine, sodium chloride flush, potassium chloride **OR** potassium chloride **OR** potassium chloride    Lab Data:  Recent Labs      09/19/18   [id number]

## 2018-09-22 LAB
ANION GAP SERPL CALCULATED.3IONS-SCNC: 11 MMOL/L (ref 3–16)
BLOOD CULTURE, ROUTINE: NORMAL
BUN BLDV-MCNC: 12 MG/DL (ref 7–20)
CALCIUM SERPL-MCNC: 8.3 MG/DL (ref 8.3–10.6)
CHLORIDE BLD-SCNC: 99 MMOL/L (ref 99–110)
CO2: 28 MMOL/L (ref 21–32)
CREAT SERPL-MCNC: 1 MG/DL (ref 0.6–1.1)
CULTURE, BLOOD 2: NORMAL
GFR AFRICAN AMERICAN: >60
GFR NON-AFRICAN AMERICAN: 57
GLUCOSE BLD-MCNC: 129 MG/DL (ref 70–99)
GLUCOSE BLD-MCNC: 136 MG/DL (ref 70–99)
GLUCOSE BLD-MCNC: 141 MG/DL (ref 70–99)
GLUCOSE BLD-MCNC: 164 MG/DL (ref 70–99)
GLUCOSE BLD-MCNC: 182 MG/DL (ref 70–99)
PERFORMED ON: ABNORMAL
POTASSIUM REFLEX MAGNESIUM: 3.6 MMOL/L (ref 3.5–5.1)
POTASSIUM SERPL-SCNC: 3.6 MMOL/L (ref 3.5–5.1)
SODIUM BLD-SCNC: 138 MMOL/L (ref 136–145)
VANCOMYCIN TROUGH: 16.2 UG/ML (ref 10–20)

## 2018-09-22 PROCEDURE — 80202 ASSAY OF VANCOMYCIN: CPT

## 2018-09-22 PROCEDURE — 51702 INSERT TEMP BLADDER CATH: CPT

## 2018-09-22 PROCEDURE — 1200000000 HC SEMI PRIVATE

## 2018-09-22 PROCEDURE — 2580000003 HC RX 258: Performed by: INTERNAL MEDICINE

## 2018-09-22 PROCEDURE — 6360000002 HC RX W HCPCS: Performed by: INTERNAL MEDICINE

## 2018-09-22 PROCEDURE — 6370000000 HC RX 637 (ALT 250 FOR IP): Performed by: INTERNAL MEDICINE

## 2018-09-22 PROCEDURE — 80048 BASIC METABOLIC PNL TOTAL CA: CPT

## 2018-09-22 PROCEDURE — 36415 COLL VENOUS BLD VENIPUNCTURE: CPT

## 2018-09-22 PROCEDURE — 80051 ELECTROLYTE PANEL: CPT

## 2018-09-22 RX ADMIN — AMPICILLIN SODIUM AND SULBACTAM SODIUM 1.5 G: 1; .5 INJECTION, POWDER, FOR SOLUTION INTRAMUSCULAR; INTRAVENOUS at 03:35

## 2018-09-22 RX ADMIN — Medication 10 ML: at 21:01

## 2018-09-22 RX ADMIN — AMPICILLIN SODIUM AND SULBACTAM SODIUM 1.5 G: 1; .5 INJECTION, POWDER, FOR SOLUTION INTRAMUSCULAR; INTRAVENOUS at 21:02

## 2018-09-22 RX ADMIN — AMPICILLIN SODIUM AND SULBACTAM SODIUM 1.5 G: 1; .5 INJECTION, POWDER, FOR SOLUTION INTRAMUSCULAR; INTRAVENOUS at 15:35

## 2018-09-22 RX ADMIN — ONDANSETRON 4 MG: 4 TABLET, ORALLY DISINTEGRATING ORAL at 21:12

## 2018-09-22 RX ADMIN — Medication 1.5 G: at 13:23

## 2018-09-22 RX ADMIN — FAMOTIDINE 20 MG: 20 TABLET ORAL at 21:01

## 2018-09-22 RX ADMIN — FAMOTIDINE 20 MG: 20 TABLET ORAL at 08:55

## 2018-09-22 RX ADMIN — FUROSEMIDE 20 MG: 10 INJECTION, SOLUTION INTRAMUSCULAR; INTRAVENOUS at 08:55

## 2018-09-22 RX ADMIN — FERROUS SULFATE TAB 325 MG (65 MG ELEMENTAL FE) 325 MG: 325 (65 FE) TAB at 17:12

## 2018-09-22 RX ADMIN — INSULIN LISPRO 2 UNITS: 100 INJECTION, SOLUTION INTRAVENOUS; SUBCUTANEOUS at 11:50

## 2018-09-22 RX ADMIN — INSULIN LISPRO 2 UNITS: 100 INJECTION, SOLUTION INTRAVENOUS; SUBCUTANEOUS at 17:12

## 2018-09-22 RX ADMIN — FUROSEMIDE 5 MG/HR: 10 INJECTION, SOLUTION INTRAVENOUS at 11:47

## 2018-09-22 RX ADMIN — FERROUS SULFATE TAB 325 MG (65 MG ELEMENTAL FE) 325 MG: 325 (65 FE) TAB at 08:55

## 2018-09-22 RX ADMIN — HYDROCODONE BITARTRATE AND ACETAMINOPHEN 1 TABLET: 5; 325 TABLET ORAL at 21:01

## 2018-09-22 RX ADMIN — Medication 10 ML: at 08:55

## 2018-09-22 RX ADMIN — Medication 1.5 G: at 01:09

## 2018-09-22 RX ADMIN — AMPICILLIN SODIUM AND SULBACTAM SODIUM 1.5 G: 1; .5 INJECTION, POWDER, FOR SOLUTION INTRAMUSCULAR; INTRAVENOUS at 10:00

## 2018-09-22 RX ADMIN — ENOXAPARIN SODIUM 40 MG: 40 INJECTION SUBCUTANEOUS at 08:55

## 2018-09-22 ASSESSMENT — PAIN DESCRIPTION - ORIENTATION: ORIENTATION: RIGHT

## 2018-09-22 ASSESSMENT — PAIN DESCRIPTION - PROGRESSION: CLINICAL_PROGRESSION: NOT CHANGED

## 2018-09-22 ASSESSMENT — PAIN DESCRIPTION - ONSET: ONSET: ON-GOING

## 2018-09-22 ASSESSMENT — PAIN DESCRIPTION - DESCRIPTORS: DESCRIPTORS: ACHING

## 2018-09-22 ASSESSMENT — PAIN DESCRIPTION - FREQUENCY: FREQUENCY: CONTINUOUS

## 2018-09-22 ASSESSMENT — PAIN DESCRIPTION - PAIN TYPE: TYPE: ACUTE PAIN

## 2018-09-22 ASSESSMENT — PAIN SCALES - GENERAL
PAINLEVEL_OUTOF10: 10
PAINLEVEL_OUTOF10: 6

## 2018-09-22 ASSESSMENT — PAIN DESCRIPTION - LOCATION: LOCATION: LEG

## 2018-09-22 NOTE — PROGRESS NOTES
IV lasix drip started and pt francisco well. Singh catheter placed per orders and clear, yellow urine noted. No acute distress. Will monitor.

## 2018-09-22 NOTE — PROGRESS NOTES
Monitor       Hyponatremia - resolved  Hypomagnesemia - Resolved  Hypokalemia - Resolved         Microcytic anemia   - mixed ACD and iron def on labs earlier this year  - cont PO supplement.        MIRIAM   Morbid Obesity  - Body mass index is 70.45 kg/m². - Complicating assessment and treatment.  Placing patient at risk for multiple co-morbidities as well as early death and contributing to the patient's presentation.   - Counseled on weight loss.       DVT Prophylaxis: Lovenox   Diet: DIET LOW FAT;  Code Status: Full Code     Laila Swan MD 9/22/2018 4:07 PM

## 2018-09-22 NOTE — FLOWSHEET NOTE
09/21/18 1916   Vital Signs   Temp 98.7 °F (37.1 °C)   Temp Source Oral   Pulse 73   Heart Rate Source Monitor   Resp 20   /72   BP Location Right lower arm   BP Upper/Lower Lower   Patient Position Up in chair   Level of Consciousness 0   MEWS Score 1   Oxygen Therapy   SpO2 92 %   O2 Device None (Room air)   Assessment complete, see flowsheets. Pt given pain medication per request and per PRN parameters. Pt denies further needs at this time. Will continue to monitor.   Tim Babcock RN

## 2018-09-22 NOTE — PLAN OF CARE
Problem: Falls - Risk of:  Goal: Will remain free from falls  Will remain free from falls   Outcome: Ongoing    Goal: Absence of physical injury  Absence of physical injury   Outcome: Ongoing      Problem: Risk for Impaired Skin Integrity  Goal: Tissue integrity - skin and mucous membranes  Structural intactness and normal physiological function of skin and  mucous membranes. Outcome: Ongoing      Problem: SAFETY  Goal: Free from accidental physical injury  Outcome: Ongoing      Problem: DAILY CARE  Goal: Daily care needs are met  Outcome: Ongoing      Problem: PAIN  Goal: Patient's pain/discomfort is manageable  Outcome: Ongoing      Problem: SKIN INTEGRITY  Goal: Skin integrity is maintained or improved  Outcome: Ongoing      Problem: KNOWLEDGE DEFICIT  Goal: Patient/S.O. demonstrates understanding of disease process, treatment plan, medications, and discharge instructions.   Outcome: Ongoing

## 2018-09-22 NOTE — PLAN OF CARE
Problem: Falls - Risk of:  Goal: Will remain free from falls  Will remain free from falls   Outcome: Ongoing      Problem: PAIN  Goal: Patient's pain/discomfort is manageable  Outcome: Ongoing

## 2018-09-23 ENCOUNTER — APPOINTMENT (OUTPATIENT)
Dept: ULTRASOUND IMAGING | Age: 58
DRG: 872 | End: 2018-09-23
Payer: MEDICARE

## 2018-09-23 LAB
ALBUMIN SERPL-MCNC: 2.5 G/DL (ref 3.4–5)
ANION GAP SERPL CALCULATED.3IONS-SCNC: 11 MMOL/L (ref 3–16)
ANISOCYTOSIS: ABNORMAL
BANDED NEUTROPHILS RELATIVE PERCENT: 6 % (ref 0–7)
BASOPHILS ABSOLUTE: 0 K/UL (ref 0–0.2)
BASOPHILS RELATIVE PERCENT: 0 %
BUN BLDV-MCNC: 8 MG/DL (ref 7–20)
CALCIUM SERPL-MCNC: 8.3 MG/DL (ref 8.3–10.6)
CHLORIDE BLD-SCNC: 95 MMOL/L (ref 99–110)
CO2: 33 MMOL/L (ref 21–32)
CREAT SERPL-MCNC: 1 MG/DL (ref 0.6–1.1)
EOSINOPHILS ABSOLUTE: 0 K/UL (ref 0–0.6)
EOSINOPHILS RELATIVE PERCENT: 0 %
GFR AFRICAN AMERICAN: >60
GFR NON-AFRICAN AMERICAN: 57
GLUCOSE BLD-MCNC: 107 MG/DL (ref 70–99)
GLUCOSE BLD-MCNC: 133 MG/DL (ref 70–99)
GLUCOSE BLD-MCNC: 146 MG/DL (ref 70–99)
GLUCOSE BLD-MCNC: 179 MG/DL (ref 70–99)
GLUCOSE BLD-MCNC: 195 MG/DL (ref 70–99)
HCT VFR BLD CALC: 23.2 % (ref 36–48)
HEMOGLOBIN: 7.5 G/DL (ref 12–16)
LYMPHOCYTES ABSOLUTE: 1.4 K/UL (ref 1–5.1)
LYMPHOCYTES RELATIVE PERCENT: 13 %
MCH RBC QN AUTO: 24.4 PG (ref 26–34)
MCHC RBC AUTO-ENTMCNC: 32.2 G/DL (ref 31–36)
MCV RBC AUTO: 75.8 FL (ref 80–100)
MONOCYTES ABSOLUTE: 0.5 K/UL (ref 0–1.3)
MONOCYTES RELATIVE PERCENT: 5 %
NEUTROPHILS ABSOLUTE: 8.9 K/UL (ref 1.7–7.7)
NEUTROPHILS RELATIVE PERCENT: 76 %
NUCLEATED RED BLOOD CELLS: 1 /100 WBC
PDW BLD-RTO: 16.7 % (ref 12.4–15.4)
PERFORMED ON: ABNORMAL
PHOSPHORUS: 3 MG/DL (ref 2.5–4.9)
PLATELET # BLD: 278 K/UL (ref 135–450)
PLATELET SLIDE REVIEW: ADEQUATE
PMV BLD AUTO: 9.6 FL (ref 5–10.5)
POTASSIUM SERPL-SCNC: 3.2 MMOL/L (ref 3.5–5.1)
RBC # BLD: 3.06 M/UL (ref 4–5.2)
SLIDE REVIEW: ABNORMAL
SODIUM BLD-SCNC: 139 MMOL/L (ref 136–145)
WBC # BLD: 10.8 K/UL (ref 4–11)

## 2018-09-23 PROCEDURE — 97530 THERAPEUTIC ACTIVITIES: CPT

## 2018-09-23 PROCEDURE — 2580000003 HC RX 258: Performed by: INTERNAL MEDICINE

## 2018-09-23 PROCEDURE — 6360000002 HC RX W HCPCS: Performed by: INTERNAL MEDICINE

## 2018-09-23 PROCEDURE — 80069 RENAL FUNCTION PANEL: CPT

## 2018-09-23 PROCEDURE — 36415 COLL VENOUS BLD VENIPUNCTURE: CPT

## 2018-09-23 PROCEDURE — 85025 COMPLETE CBC W/AUTO DIFF WBC: CPT

## 2018-09-23 PROCEDURE — 6370000000 HC RX 637 (ALT 250 FOR IP): Performed by: INTERNAL MEDICINE

## 2018-09-23 PROCEDURE — 1200000000 HC SEMI PRIVATE

## 2018-09-23 PROCEDURE — 76882 US LMTD JT/FCL EVL NVASC XTR: CPT

## 2018-09-23 PROCEDURE — G0328 FECAL BLOOD SCRN IMMUNOASSAY: HCPCS

## 2018-09-23 RX ORDER — POTASSIUM CHLORIDE 7.45 MG/ML
10 INJECTION INTRAVENOUS
Status: COMPLETED | OUTPATIENT
Start: 2018-09-23 | End: 2018-09-23

## 2018-09-23 RX ORDER — POTASSIUM CHLORIDE 750 MG/1
40 TABLET, EXTENDED RELEASE ORAL 2 TIMES DAILY WITH MEALS
Status: DISCONTINUED | OUTPATIENT
Start: 2018-09-23 | End: 2018-09-26 | Stop reason: HOSPADM

## 2018-09-23 RX ORDER — POTASSIUM CHLORIDE 7.45 MG/ML
40 INJECTION INTRAVENOUS ONCE
Status: DISCONTINUED | OUTPATIENT
Start: 2018-09-23 | End: 2018-09-23 | Stop reason: SDUPTHER

## 2018-09-23 RX ADMIN — FERROUS SULFATE TAB 325 MG (65 MG ELEMENTAL FE) 325 MG: 325 (65 FE) TAB at 16:25

## 2018-09-23 RX ADMIN — ACETAMINOPHEN 1000 MG: 500 TABLET ORAL at 16:25

## 2018-09-23 RX ADMIN — POTASSIUM CHLORIDE 10 MEQ: 7.46 INJECTION, SOLUTION INTRAVENOUS at 14:17

## 2018-09-23 RX ADMIN — POTASSIUM CHLORIDE 40 MEQ: 20 TABLET, EXTENDED RELEASE ORAL at 18:08

## 2018-09-23 RX ADMIN — Medication 10 ML: at 21:29

## 2018-09-23 RX ADMIN — Medication 1.5 G: at 00:34

## 2018-09-23 RX ADMIN — IRON SUCROSE 200 MG: 20 INJECTION, SOLUTION INTRAVENOUS at 15:23

## 2018-09-23 RX ADMIN — POTASSIUM CHLORIDE 40 MEQ: 20 TABLET, EXTENDED RELEASE ORAL at 07:26

## 2018-09-23 RX ADMIN — FAMOTIDINE 20 MG: 20 TABLET ORAL at 21:29

## 2018-09-23 RX ADMIN — Medication 1.5 G: at 18:06

## 2018-09-23 RX ADMIN — POTASSIUM CHLORIDE 10 MEQ: 7.46 INJECTION, SOLUTION INTRAVENOUS at 11:39

## 2018-09-23 RX ADMIN — FERROUS SULFATE TAB 325 MG (65 MG ELEMENTAL FE) 325 MG: 325 (65 FE) TAB at 09:23

## 2018-09-23 RX ADMIN — ENOXAPARIN SODIUM 40 MG: 40 INJECTION SUBCUTANEOUS at 09:23

## 2018-09-23 RX ADMIN — INSULIN LISPRO 1 UNITS: 100 INJECTION, SOLUTION INTRAVENOUS; SUBCUTANEOUS at 21:42

## 2018-09-23 RX ADMIN — INSULIN LISPRO 2 UNITS: 100 INJECTION, SOLUTION INTRAVENOUS; SUBCUTANEOUS at 09:23

## 2018-09-23 RX ADMIN — Medication 10 ML: at 09:23

## 2018-09-23 RX ADMIN — AMPICILLIN SODIUM AND SULBACTAM SODIUM 1.5 G: 1; .5 INJECTION, POWDER, FOR SOLUTION INTRAMUSCULAR; INTRAVENOUS at 02:51

## 2018-09-23 RX ADMIN — POTASSIUM CHLORIDE 10 MEQ: 7.46 INJECTION, SOLUTION INTRAVENOUS at 12:55

## 2018-09-23 RX ADMIN — AMPICILLIN SODIUM AND SULBACTAM SODIUM 1.5 G: 1; .5 INJECTION, POWDER, FOR SOLUTION INTRAMUSCULAR; INTRAVENOUS at 19:59

## 2018-09-23 RX ADMIN — POTASSIUM CHLORIDE 10 MEQ: 7.46 INJECTION, SOLUTION INTRAVENOUS at 10:32

## 2018-09-23 RX ADMIN — AMPICILLIN SODIUM AND SULBACTAM SODIUM 1.5 G: 1; .5 INJECTION, POWDER, FOR SOLUTION INTRAMUSCULAR; INTRAVENOUS at 09:24

## 2018-09-23 RX ADMIN — INSULIN LISPRO 2 UNITS: 100 INJECTION, SOLUTION INTRAVENOUS; SUBCUTANEOUS at 11:39

## 2018-09-23 RX ADMIN — FAMOTIDINE 20 MG: 20 TABLET ORAL at 09:23

## 2018-09-23 ASSESSMENT — PAIN SCALES - GENERAL: PAINLEVEL_OUTOF10: 0

## 2018-09-23 NOTE — PROGRESS NOTES
Inpatient Physical Therapy Daily Treatment Note    Unit:  2West   Date:  9/23/2018  Patient Name:    Lizzie Vieira  Admitting diagnosis:  Sepsis [facility]) [A41.9]  Admit Date:  9/17/2018  Precautions/Restrictions:  Fall risk, telemetry, RLE cellulitis, allen catheter     Discharge Recommendations: SNF  DME needs for discharge:  Defer to SNF    If pt declines opportunity to DC to SNF, she will require 24/7 assist at home and home PT/OT. AM-PAC Mobility Score   AM-PAC Inpatient Mobility Raw Score : 17        Treatment Time: 08:48 - 09:08  Treatment Number:  3    Subjective    Pt. Sitting up on EOB, moaning in discomfort with call light on. Pt requesting help for repositioning back in bed; pt declined opportunity to get OOB to chair at this time, but states she may sit up in it later this date. Pt agreeable to this PT assisting her. Pain    Pt c/o discomfort in RLE from having it in dependent position while she at breakfast; pain not rated  Pt satisfied with repositioning and denies need for other pain intervention   Bed Mobility   Supine to Sit: NT  Sit to Supine: CGA with HOB raised   Rolling: SBA  Scooting: CGA to HOB (PT holding pt's foot in place to facilitate push up to top of bed)    Transfer Training   Sit to stand: SBA from EOB   Stand to sit: SBA to EOB  Bed to Chair: NT    Gait Training   Patient ambulated 2 small steps at EOB with RW and CGA. Unable to assess gait over this short distance. Therapeutic Exercise pt declined Matthias today  Ankle Pumps:  Quad Sets:  Glute Sets:  Heel Slides:  SLR:  Hip ABD/ADD:  LAQ:    Balance     Sitting: good  Standing: good (-)  Patient Education       Educated on role of PT, DC recommendations, POC, importance of mobility and getting OOB, and safety awareness   Positioning Needs       Pt side-lying in bed with pillow between legs, alarm active, call light and needs in reach.     Activity Tolerance   SpO2: 95% with moderate CORRAL on RA  HR 80 bpm  Pt appearing quite fatigued with this activity   Assessment   Patient participating fairly this morning, agreeable to treatment mainly for repositioning in bed. Pt requires CGA/SBA for bed mobility transfers at this time, though she does get quite fatigued and demonstrates moderate CORRAL. At this time, as pt continues to have poor tolerance for activity, will continue to recommend SNF upon dc. Goal(s) : To be met in 3 visits:  1). Independent with LE Ex x 10 reps Met per pt report      To be met in 6 visits:  1). Supine to/from sit with mod I - Not met, continue to work toward goal   2). Sit to/from stand with supervision - Not met, continue to work toward goal   3). Bed to chair with RW and supervision - Not met, continue to work toward goal   4). Gait: ambulate 48' with RW and SBA - Not met, continue to work toward goal   5). Tolerate B LE exercises 10 reps - Not met, continue to work toward goal   6). Up and Down steps x 1 with RW and SBA - Not met, continue to work toward goal   7). Maintain O2 sats >/=90% with all activity - Met, continue for consistency      Plan   Continue with plan of care. At end of treatment, patient in bed, alarm active, with call light and needs within reach. Time Coded Treatment Minutes:   20 minutes    Total Treatment Time:  20 minutes    Jose G Irving PT, DPT #[id number]     If patient discharges from this facility prior to next visit, this note will serve as the Discharge Summary.

## 2018-09-24 LAB
ALBUMIN SERPL-MCNC: 2.5 G/DL (ref 3.4–5)
ANION GAP SERPL CALCULATED.3IONS-SCNC: 10 MMOL/L (ref 3–16)
ANISOCYTOSIS: ABNORMAL
ATYPICAL LYMPHOCYTE RELATIVE PERCENT: 2 % (ref 0–6)
BANDED NEUTROPHILS RELATIVE PERCENT: 1 % (ref 0–7)
BASOPHILS ABSOLUTE: 0.1 K/UL (ref 0–0.2)
BASOPHILS RELATIVE PERCENT: 1 %
BUN BLDV-MCNC: 8 MG/DL (ref 7–20)
CALCIUM SERPL-MCNC: 8.2 MG/DL (ref 8.3–10.6)
CHLORIDE BLD-SCNC: 96 MMOL/L (ref 99–110)
CO2: 33 MMOL/L (ref 21–32)
CREAT SERPL-MCNC: 1.1 MG/DL (ref 0.6–1.1)
EOSINOPHILS ABSOLUTE: 0.1 K/UL (ref 0–0.6)
EOSINOPHILS RELATIVE PERCENT: 1 %
GFR AFRICAN AMERICAN: >60
GFR NON-AFRICAN AMERICAN: 51
GLUCOSE BLD-MCNC: 127 MG/DL (ref 70–99)
GLUCOSE BLD-MCNC: 129 MG/DL (ref 70–99)
GLUCOSE BLD-MCNC: 157 MG/DL (ref 70–99)
GLUCOSE BLD-MCNC: 177 MG/DL (ref 70–99)
GLUCOSE BLD-MCNC: 200 MG/DL (ref 70–99)
HCT VFR BLD CALC: 24 % (ref 36–48)
HEMOGLOBIN: 7.7 G/DL (ref 12–16)
HYPOCHROMIA: ABNORMAL
LYMPHOCYTES ABSOLUTE: 1.8 K/UL (ref 1–5.1)
LYMPHOCYTES RELATIVE PERCENT: 16 %
MCH RBC QN AUTO: 24.2 PG (ref 26–34)
MCHC RBC AUTO-ENTMCNC: 32 G/DL (ref 31–36)
MCV RBC AUTO: 75.6 FL (ref 80–100)
MONOCYTES ABSOLUTE: 0.7 K/UL (ref 0–1.3)
MONOCYTES RELATIVE PERCENT: 7 %
NEUTROPHILS ABSOLUTE: 7.3 K/UL (ref 1.7–7.7)
NEUTROPHILS RELATIVE PERCENT: 72 %
PDW BLD-RTO: 16.8 % (ref 12.4–15.4)
PERFORMED ON: ABNORMAL
PHOSPHORUS: 3.5 MG/DL (ref 2.5–4.9)
PLATELET # BLD: 348 K/UL (ref 135–450)
PLATELET SLIDE REVIEW: ADEQUATE
PMV BLD AUTO: 9.9 FL (ref 5–10.5)
POLYCHROMASIA: ABNORMAL
POTASSIUM SERPL-SCNC: 3.6 MMOL/L (ref 3.5–5.1)
RBC # BLD: 3.17 M/UL (ref 4–5.2)
SLIDE REVIEW: ABNORMAL
SODIUM BLD-SCNC: 139 MMOL/L (ref 136–145)
WBC # BLD: 10 K/UL (ref 4–11)

## 2018-09-24 PROCEDURE — 6360000002 HC RX W HCPCS: Performed by: INTERNAL MEDICINE

## 2018-09-24 PROCEDURE — 2580000003 HC RX 258: Performed by: INTERNAL MEDICINE

## 2018-09-24 PROCEDURE — 6370000000 HC RX 637 (ALT 250 FOR IP): Performed by: INTERNAL MEDICINE

## 2018-09-24 PROCEDURE — 1200000000 HC SEMI PRIVATE

## 2018-09-24 PROCEDURE — 99232 SBSQ HOSP IP/OBS MODERATE 35: CPT | Performed by: INTERNAL MEDICINE

## 2018-09-24 PROCEDURE — 80069 RENAL FUNCTION PANEL: CPT

## 2018-09-24 PROCEDURE — 85025 COMPLETE CBC W/AUTO DIFF WBC: CPT

## 2018-09-24 PROCEDURE — 36415 COLL VENOUS BLD VENIPUNCTURE: CPT

## 2018-09-24 RX ORDER — FUROSEMIDE 10 MG/ML
40 INJECTION INTRAMUSCULAR; INTRAVENOUS 3 TIMES DAILY
Status: DISCONTINUED | OUTPATIENT
Start: 2018-09-24 | End: 2018-09-26

## 2018-09-24 RX ADMIN — ENOXAPARIN SODIUM 40 MG: 40 INJECTION SUBCUTANEOUS at 08:02

## 2018-09-24 RX ADMIN — FERROUS SULFATE TAB 325 MG (65 MG ELEMENTAL FE) 325 MG: 325 (65 FE) TAB at 08:01

## 2018-09-24 RX ADMIN — Medication 1.5 G: at 17:26

## 2018-09-24 RX ADMIN — AMPICILLIN SODIUM AND SULBACTAM SODIUM 1.5 G: 1; .5 INJECTION, POWDER, FOR SOLUTION INTRAMUSCULAR; INTRAVENOUS at 21:15

## 2018-09-24 RX ADMIN — HYDROCODONE BITARTRATE AND ACETAMINOPHEN 1 TABLET: 5; 325 TABLET ORAL at 08:00

## 2018-09-24 RX ADMIN — HYDROCODONE BITARTRATE AND ACETAMINOPHEN 1 TABLET: 5; 325 TABLET ORAL at 17:25

## 2018-09-24 RX ADMIN — INSULIN LISPRO 2 UNITS: 100 INJECTION, SOLUTION INTRAVENOUS; SUBCUTANEOUS at 21:23

## 2018-09-24 RX ADMIN — Medication 10 ML: at 21:15

## 2018-09-24 RX ADMIN — AMPICILLIN SODIUM AND SULBACTAM SODIUM 1.5 G: 1; .5 INJECTION, POWDER, FOR SOLUTION INTRAMUSCULAR; INTRAVENOUS at 02:45

## 2018-09-24 RX ADMIN — Medication 10 ML: at 08:02

## 2018-09-24 RX ADMIN — FUROSEMIDE 40 MG: 10 INJECTION, SOLUTION INTRAMUSCULAR; INTRAVENOUS at 21:15

## 2018-09-24 RX ADMIN — AMPICILLIN SODIUM AND SULBACTAM SODIUM 1.5 G: 1; .5 INJECTION, POWDER, FOR SOLUTION INTRAMUSCULAR; INTRAVENOUS at 13:42

## 2018-09-24 RX ADMIN — INSULIN LISPRO 2 UNITS: 100 INJECTION, SOLUTION INTRAVENOUS; SUBCUTANEOUS at 11:29

## 2018-09-24 RX ADMIN — FUROSEMIDE 40 MG: 10 INJECTION, SOLUTION INTRAMUSCULAR; INTRAVENOUS at 13:41

## 2018-09-24 RX ADMIN — FAMOTIDINE 20 MG: 20 TABLET ORAL at 08:01

## 2018-09-24 RX ADMIN — Medication 1.5 G: at 05:19

## 2018-09-24 RX ADMIN — FERROUS SULFATE TAB 325 MG (65 MG ELEMENTAL FE) 325 MG: 325 (65 FE) TAB at 17:24

## 2018-09-24 RX ADMIN — INSULIN LISPRO 2 UNITS: 100 INJECTION, SOLUTION INTRAVENOUS; SUBCUTANEOUS at 08:03

## 2018-09-24 RX ADMIN — FAMOTIDINE 20 MG: 20 TABLET ORAL at 21:15

## 2018-09-24 RX ADMIN — AMPICILLIN SODIUM AND SULBACTAM SODIUM 1.5 G: 1; .5 INJECTION, POWDER, FOR SOLUTION INTRAMUSCULAR; INTRAVENOUS at 08:12

## 2018-09-24 RX ADMIN — IRON SUCROSE 200 MG: 20 INJECTION, SOLUTION INTRAVENOUS at 10:19

## 2018-09-24 RX ADMIN — POTASSIUM CHLORIDE 40 MEQ: 20 TABLET, EXTENDED RELEASE ORAL at 08:01

## 2018-09-24 RX ADMIN — FUROSEMIDE 40 MG: 10 INJECTION, SOLUTION INTRAMUSCULAR; INTRAVENOUS at 10:22

## 2018-09-24 ASSESSMENT — PAIN SCALES - GENERAL
PAINLEVEL_OUTOF10: 10
PAINLEVEL_OUTOF10: 8
PAINLEVEL_OUTOF10: 10
PAINLEVEL_OUTOF10: 10

## 2018-09-24 ASSESSMENT — PAIN DESCRIPTION - FREQUENCY: FREQUENCY: CONTINUOUS

## 2018-09-24 ASSESSMENT — PAIN DESCRIPTION - PROGRESSION: CLINICAL_PROGRESSION: GRADUALLY WORSENING

## 2018-09-24 ASSESSMENT — PAIN DESCRIPTION - LOCATION: LOCATION: LEG

## 2018-09-24 ASSESSMENT — PAIN DESCRIPTION - ORIENTATION: ORIENTATION: RIGHT

## 2018-09-24 ASSESSMENT — PAIN DESCRIPTION - ONSET: ONSET: ON-GOING

## 2018-09-24 ASSESSMENT — PAIN DESCRIPTION - DESCRIPTORS: DESCRIPTORS: ACHING;SHARP

## 2018-09-24 ASSESSMENT — PAIN DESCRIPTION - PAIN TYPE: TYPE: ACUTE PAIN

## 2018-09-24 NOTE — PROGRESS NOTES
Bedside report given to [facility] with no signs of distress noted. Pt denies needs.

## 2018-09-24 NOTE — PROGRESS NOTES
Occupational Therapy      Attempted OT follow up. Pt. Refused OT session for today despite encouragement secondary to fatigue. Plan to re-attempt tomorrow.     Cecille Resendiz, OTR/L  #[id number]

## 2018-09-24 NOTE — PLAN OF CARE
Problem: Falls - Risk of:  Goal: Will remain free from falls  Will remain free from falls   Outcome: Ongoing      Problem: PAIN  Goal: Patient's pain/discomfort is manageable  Outcome: Ongoing      Problem: SKIN INTEGRITY  Goal: Skin integrity is maintained or improved  Outcome: Ongoing

## 2018-09-24 NOTE — PROGRESS NOTES
Shift assessment completed:    Alert and Oriented x4. Vitals are WNL. Respiratory status is regular, easy, unlabored, and SpO2 is 92% on RA. Pain is 10/10, located in right leg, PRN pain medication given at this time. Pt denies any other complications at this time. SR up 2/4. Bed in lowest position. Call light within reach. Bed alarm is on. Will monitor.

## 2018-09-24 NOTE — PROGRESS NOTES
PM assessment completed. See flow sheet. Pt A&O X 4. Resp E&E with no distress noted. Pt declined BLE dressing states wants CIRA. Pt.denies any needs at this time. Call light within reach. Will continue to monitor.

## 2018-09-24 NOTE — PROGRESS NOTES
glucose, dextrose, glucagon (rDNA), dextrose, glucose, dextrose, glucagon (rDNA), dextrose, HYDROcodone 5 mg - acetaminophen, ondansetron, acetaminophen, morphine, sodium chloride flush, potassium chloride **OR** potassium chloride **OR** potassium chloride    Lab Data:  Recent Labs      09/23/18   0511  09/24/18   0539   WBC  10.8  10.0   HGB  7.5*  7.7*   HCT  23.2*  24.0*   MCV  75.8*  75.6*   PLT  278  348     Recent Labs      09/22/18   0515  09/23/18   0511  09/24/18   0539   NA  138  139  139   K  3.6  3.6  3.2*  3.6   CL  99  95*  96*   CO2  28  33*  33*   PHOS   --   3.0  3.5   BUN  12  8  8   CREATININE  1.0  1.0  1.1     No results for input(s): CKTOTAL, CKMB, CKMBINDEX, TROPONINI in the last 72 hours. Coagulation: No results found for: INR, APTT  Cardiac markers:   Lab Results   Component Value Date    TROPONINI <0.01 04/05/2017         Lab Results   Component Value Date    ALT 22 09/17/2018    AST 87 (H) 09/17/2018    ALKPHOS 93 09/17/2018    BILITOT 0.7 09/17/2018             XR CHEST PORTABLE   Final Result   Cardial-pericardial silhouette enlargement. Differential considerations   include pericardial effusion and cardiomyopathy.       Pulmonary vascular congestion.               Blood cx x2: NGTD        ASSESSMENT:     Sepsis  Cellulitis of RLE   - with leukocytosis, lactic acidosis, fever, tachypnea and tachycardia   - on unasyn, vanc    - improved lactate and resolved fevers   - pain control.   - check venous doppler - no DVT, + R inguinal lymphadenopathy. - ordered US soft tissue R calf - indurated area - eval for abscess. - at this time edema has much improved with lasix gtt. I am still concerned about 2 areas - desquamation R anterior leg surface, and induration R posterior leg surface. - cont with wound care and Iv Abx, offloading and fluid mobilization. Leg Edema -   ECHO 2017 + mild pulm HTN, concentric LVH, preserved EF.    Suspect combination of body habitus with

## 2018-09-25 LAB
ALBUMIN SERPL-MCNC: 2.7 G/DL (ref 3.4–5)
ANION GAP SERPL CALCULATED.3IONS-SCNC: 12 MMOL/L (ref 3–16)
BUN BLDV-MCNC: 9 MG/DL (ref 7–20)
CALCIUM SERPL-MCNC: 8.6 MG/DL (ref 8.3–10.6)
CHLORIDE BLD-SCNC: 92 MMOL/L (ref 99–110)
CO2: 33 MMOL/L (ref 21–32)
CREAT SERPL-MCNC: 1.1 MG/DL (ref 0.6–1.1)
GFR AFRICAN AMERICAN: >60
GFR NON-AFRICAN AMERICAN: 51
GLUCOSE BLD-MCNC: 113 MG/DL (ref 70–99)
GLUCOSE BLD-MCNC: 133 MG/DL (ref 70–99)
GLUCOSE BLD-MCNC: 140 MG/DL (ref 70–99)
GLUCOSE BLD-MCNC: 146 MG/DL (ref 70–99)
GLUCOSE BLD-MCNC: 155 MG/DL (ref 70–99)
PERFORMED ON: ABNORMAL
PHOSPHORUS: 3.8 MG/DL (ref 2.5–4.9)
POTASSIUM SERPL-SCNC: 3.6 MMOL/L (ref 3.5–5.1)
SODIUM BLD-SCNC: 137 MMOL/L (ref 136–145)
VANCOMYCIN TROUGH: 27.5 UG/ML (ref 10–20)

## 2018-09-25 PROCEDURE — 2580000003 HC RX 258: Performed by: INTERNAL MEDICINE

## 2018-09-25 PROCEDURE — 6370000000 HC RX 637 (ALT 250 FOR IP): Performed by: INTERNAL MEDICINE

## 2018-09-25 PROCEDURE — 99232 SBSQ HOSP IP/OBS MODERATE 35: CPT | Performed by: INTERNAL MEDICINE

## 2018-09-25 PROCEDURE — 97530 THERAPEUTIC ACTIVITIES: CPT

## 2018-09-25 PROCEDURE — 97110 THERAPEUTIC EXERCISES: CPT

## 2018-09-25 PROCEDURE — 80202 ASSAY OF VANCOMYCIN: CPT

## 2018-09-25 PROCEDURE — 80069 RENAL FUNCTION PANEL: CPT

## 2018-09-25 PROCEDURE — 6360000002 HC RX W HCPCS: Performed by: INTERNAL MEDICINE

## 2018-09-25 PROCEDURE — 1200000000 HC SEMI PRIVATE

## 2018-09-25 PROCEDURE — 97535 SELF CARE MNGMENT TRAINING: CPT

## 2018-09-25 PROCEDURE — 36415 COLL VENOUS BLD VENIPUNCTURE: CPT

## 2018-09-25 RX ADMIN — AMPICILLIN SODIUM AND SULBACTAM SODIUM 1.5 G: 1; .5 INJECTION, POWDER, FOR SOLUTION INTRAMUSCULAR; INTRAVENOUS at 08:36

## 2018-09-25 RX ADMIN — FAMOTIDINE 20 MG: 20 TABLET ORAL at 20:20

## 2018-09-25 RX ADMIN — Medication 10 ML: at 20:20

## 2018-09-25 RX ADMIN — Medication 1.5 G: at 05:48

## 2018-09-25 RX ADMIN — INSULIN LISPRO 2 UNITS: 100 INJECTION, SOLUTION INTRAVENOUS; SUBCUTANEOUS at 12:25

## 2018-09-25 RX ADMIN — INSULIN LISPRO 2 UNITS: 100 INJECTION, SOLUTION INTRAVENOUS; SUBCUTANEOUS at 08:38

## 2018-09-25 RX ADMIN — HYDROCODONE BITARTRATE AND ACETAMINOPHEN 1 TABLET: 5; 325 TABLET ORAL at 08:36

## 2018-09-25 RX ADMIN — IRON SUCROSE 200 MG: 20 INJECTION, SOLUTION INTRAVENOUS at 10:33

## 2018-09-25 RX ADMIN — FUROSEMIDE 40 MG: 10 INJECTION, SOLUTION INTRAMUSCULAR; INTRAVENOUS at 08:37

## 2018-09-25 RX ADMIN — FAMOTIDINE 20 MG: 20 TABLET ORAL at 08:37

## 2018-09-25 RX ADMIN — FERROUS SULFATE TAB 325 MG (65 MG ELEMENTAL FE) 325 MG: 325 (65 FE) TAB at 17:07

## 2018-09-25 RX ADMIN — AMPICILLIN SODIUM AND SULBACTAM SODIUM 1.5 G: 1; .5 INJECTION, POWDER, FOR SOLUTION INTRAMUSCULAR; INTRAVENOUS at 20:20

## 2018-09-25 RX ADMIN — FERROUS SULFATE TAB 325 MG (65 MG ELEMENTAL FE) 325 MG: 325 (65 FE) TAB at 08:37

## 2018-09-25 RX ADMIN — ENOXAPARIN SODIUM 40 MG: 40 INJECTION SUBCUTANEOUS at 08:37

## 2018-09-25 RX ADMIN — AMPICILLIN SODIUM AND SULBACTAM SODIUM 1.5 G: 1; .5 INJECTION, POWDER, FOR SOLUTION INTRAMUSCULAR; INTRAVENOUS at 03:25

## 2018-09-25 RX ADMIN — POTASSIUM CHLORIDE 40 MEQ: 20 TABLET, EXTENDED RELEASE ORAL at 17:07

## 2018-09-25 RX ADMIN — Medication 1.5 G: at 17:07

## 2018-09-25 RX ADMIN — POTASSIUM CHLORIDE 40 MEQ: 20 TABLET, EXTENDED RELEASE ORAL at 08:37

## 2018-09-25 RX ADMIN — FUROSEMIDE 40 MG: 10 INJECTION, SOLUTION INTRAMUSCULAR; INTRAVENOUS at 20:19

## 2018-09-25 RX ADMIN — INSULIN LISPRO 1 UNITS: 100 INJECTION, SOLUTION INTRAVENOUS; SUBCUTANEOUS at 20:32

## 2018-09-25 RX ADMIN — FUROSEMIDE 40 MG: 10 INJECTION, SOLUTION INTRAMUSCULAR; INTRAVENOUS at 14:35

## 2018-09-25 RX ADMIN — AMPICILLIN SODIUM AND SULBACTAM SODIUM 1.5 G: 1; .5 INJECTION, POWDER, FOR SOLUTION INTRAMUSCULAR; INTRAVENOUS at 14:35

## 2018-09-25 RX ADMIN — Medication 10 ML: at 08:37

## 2018-09-25 ASSESSMENT — PAIN SCALES - GENERAL: PAINLEVEL_OUTOF10: 10

## 2018-09-25 NOTE — PROGRESS NOTES
Vancomycin Day: 9    Patient's labs, cultures, vitals, and vancomycin regimen reviewed. No changes today. Trough due today.   Dorcas BARAJAS 9/22/230755:44 PM  .

## 2018-09-25 NOTE — PROGRESS NOTES
IM Progress Note    Admit Date:  9/17/2018  8    Interval history:      Admitted for cellulitis of right leg     No fevers    BP stable       Subjective:  Ms. Maddy Harden reports improved r leg pain. Started on lasix gtt 9/22 - thus far put out  6.6L of urine. Leg edema much improved. Objective:   BP (!) 145/81   Pulse 65   Temp 97.5 °F (36.4 °C) (Oral)   Resp 18   Ht 5' 3\" (1.6 m)   Wt (!) 372 lb (168.7 kg)   LMP 09/10/2018   SpO2 92%   BMI 65.90 kg/m²       Intake/Output Summary (Last 24 hours) at 09/25/18 0925  Last data filed at 09/25/18 5896   Gross per 24 hour   Intake              910 ml   Output             5900 ml   Net            -4990 ml       Physical Exam:        General: elderly female morbidly obese,   Awake, alert and oriented. Mucous Membranes:  Pink , anicteric  Neck: No JVD, no carotid bruit, no thyromegaly  Chest:  Clear to auscultation bilaterally, occasional wheeze  Cardiovascular:  RRR S1S2 heard, no murmurs or gallops  Abdomen:  Soft, morbidly obese,  undistended, non tender, no organomegaly, BS present  Extremities:diffuse ariella pedal edema , abd wall edema with large pannus and intertriginous dermatitis . improving redeness and warmth to right leg but edema remains  2+ edema ariella .  Distal pulses well felt  Neurological : grossly normal              Medications:   Scheduled Medications:    furosemide  40 mg Intravenous TID    iron sucrose (VENOFER) iv piggyback 100 mL (Admin over 60 minutes)  200 mg Intravenous Q24H    potassium chloride  40 mEq Oral BID WC    vancomycin  1,500 mg Intravenous Q12H    insulin lispro  0-12 Units Subcutaneous TID WC    insulin lispro  0-6 Units Subcutaneous Nightly    ampicillin-sulbactam  1.5 g Intravenous Q6H    ferrous sulfate  325 mg Oral BID WC    sodium chloride flush  10 mL Intravenous 2 times per day    enoxaparin  40 mg Subcutaneous Daily    famotidine  20 mg Oral BID     I   dextrose      dextrose       glucose, disease with possible Right heart contribution. Plan:    - placed allen. - 9/22 -started lasix IV gtt 5/hr - 6.6L out first 24hrs - decrease lasix gtt to 2/hr 9/23.    - R leg cellulitis unlikely to improve unless we are able to mobilize fluids and imrpove R leg circulation and offloading. Switch to lasix 40 mg q 8. Excellent diuresis        Acute kidney injury - resolved  - suspect with sepsis  - monitor closely with IV lasix. Hyponatremia - resolved  Hypomagnesemia - Resolved  Hypokalemia - replete IV and PO and monitor with lasix gtt.          Microcytic anemia   - mixed ACD and iron def on labs earlier this year  - cont PO supplement.   - give Iv venofer while in house.   - get FOBT. - she denies melena, no tarry stools. Never had colonoscopy. Also c/o vaginal bleeding       MIRIAM   Morbid Obesity  - Body mass index is 70.45 kg/m². - Complicating assessment and treatment.  Placing patient at risk for multiple co-morbidities as well as early death and contributing to the patient's presentation.   - Counseled on weight loss.       DVT Prophylaxis: Lovenox   Diet: DIET LOW FAT;  Code Status: Full Code         Cachorro Cadena MD 9/25/2018 9:25 AM

## 2018-09-25 NOTE — PROGRESS NOTES
D: Bedside report received from [facility], all current treatments, skin issues, and plan of care were reviewed by both RN's, care transferred.

## 2018-09-26 VITALS
SYSTOLIC BLOOD PRESSURE: 141 MMHG | WEIGHT: 293 LBS | DIASTOLIC BLOOD PRESSURE: 90 MMHG | HEART RATE: 72 BPM | RESPIRATION RATE: 16 BRPM | OXYGEN SATURATION: 96 % | TEMPERATURE: 97.4 F | BODY MASS INDEX: 51.91 KG/M2 | HEIGHT: 63 IN

## 2018-09-26 LAB
ALBUMIN SERPL-MCNC: 2.7 G/DL (ref 3.4–5)
ANION GAP SERPL CALCULATED.3IONS-SCNC: 9 MMOL/L (ref 3–16)
BUN BLDV-MCNC: 10 MG/DL (ref 7–20)
CALCIUM SERPL-MCNC: 8.6 MG/DL (ref 8.3–10.6)
CHLORIDE BLD-SCNC: 92 MMOL/L (ref 99–110)
CO2: 33 MMOL/L (ref 21–32)
CREAT SERPL-MCNC: 1.3 MG/DL (ref 0.6–1.1)
GFR AFRICAN AMERICAN: 51
GFR NON-AFRICAN AMERICAN: 42
GLUCOSE BLD-MCNC: 107 MG/DL (ref 70–99)
GLUCOSE BLD-MCNC: 118 MG/DL (ref 70–99)
GLUCOSE BLD-MCNC: 169 MG/DL (ref 70–99)
HCT VFR BLD CALC: 26.7 % (ref 36–48)
HEMOGLOBIN: 8.5 G/DL (ref 12–16)
MCH RBC QN AUTO: 24.3 PG (ref 26–34)
MCHC RBC AUTO-ENTMCNC: 31.8 G/DL (ref 31–36)
MCV RBC AUTO: 76.3 FL (ref 80–100)
PDW BLD-RTO: 16.8 % (ref 12.4–15.4)
PERFORMED ON: ABNORMAL
PERFORMED ON: ABNORMAL
PHOSPHORUS: 3.7 MG/DL (ref 2.5–4.9)
PLATELET # BLD: 423 K/UL (ref 135–450)
PMV BLD AUTO: 9.3 FL (ref 5–10.5)
POTASSIUM SERPL-SCNC: 4.1 MMOL/L (ref 3.5–5.1)
RBC # BLD: 3.5 M/UL (ref 4–5.2)
SODIUM BLD-SCNC: 134 MMOL/L (ref 136–145)
VANCOMYCIN TROUGH: 15.8 UG/ML (ref 10–20)
WBC # BLD: 9.7 K/UL (ref 4–11)

## 2018-09-26 PROCEDURE — 85027 COMPLETE CBC AUTOMATED: CPT

## 2018-09-26 PROCEDURE — 6370000000 HC RX 637 (ALT 250 FOR IP): Performed by: INTERNAL MEDICINE

## 2018-09-26 PROCEDURE — 80069 RENAL FUNCTION PANEL: CPT

## 2018-09-26 PROCEDURE — 2580000003 HC RX 258: Performed by: INTERNAL MEDICINE

## 2018-09-26 PROCEDURE — 36415 COLL VENOUS BLD VENIPUNCTURE: CPT

## 2018-09-26 PROCEDURE — 6360000002 HC RX W HCPCS: Performed by: INTERNAL MEDICINE

## 2018-09-26 PROCEDURE — 99239 HOSP IP/OBS DSCHRG MGMT >30: CPT | Performed by: INTERNAL MEDICINE

## 2018-09-26 PROCEDURE — 80202 ASSAY OF VANCOMYCIN: CPT

## 2018-09-26 RX ORDER — SODIUM CHLORIDE 9 MG/ML
INJECTION, SOLUTION INTRAVENOUS
Status: DISPENSED
Start: 2018-09-26 | End: 2018-09-26

## 2018-09-26 RX ORDER — FUROSEMIDE 40 MG/1
40 TABLET ORAL DAILY
Qty: 30 TABLET | Refills: 0 | Status: SHIPPED | OUTPATIENT
Start: 2018-09-26

## 2018-09-26 RX ORDER — FERROUS SULFATE 325(65) MG
325 TABLET ORAL 2 TIMES DAILY WITH MEALS
Qty: 60 TABLET | Refills: 0 | Status: SHIPPED | OUTPATIENT
Start: 2018-09-26

## 2018-09-26 RX ADMIN — AMPICILLIN SODIUM AND SULBACTAM SODIUM 1.5 G: 1; .5 INJECTION, POWDER, FOR SOLUTION INTRAMUSCULAR; INTRAVENOUS at 02:05

## 2018-09-26 RX ADMIN — FERROUS SULFATE TAB 325 MG (65 MG ELEMENTAL FE) 325 MG: 325 (65 FE) TAB at 09:18

## 2018-09-26 RX ADMIN — POTASSIUM CHLORIDE 40 MEQ: 20 TABLET, EXTENDED RELEASE ORAL at 09:18

## 2018-09-26 RX ADMIN — HYDROCODONE BITARTRATE AND ACETAMINOPHEN 1 TABLET: 5; 325 TABLET ORAL at 02:08

## 2018-09-26 RX ADMIN — FAMOTIDINE 20 MG: 20 TABLET ORAL at 09:18

## 2018-09-26 RX ADMIN — Medication 10 ML: at 08:58

## 2018-09-26 RX ADMIN — INSULIN LISPRO 2 UNITS: 100 INJECTION, SOLUTION INTRAVENOUS; SUBCUTANEOUS at 12:09

## 2018-09-26 RX ADMIN — ENOXAPARIN SODIUM 40 MG: 40 INJECTION SUBCUTANEOUS at 09:18

## 2018-09-26 RX ADMIN — Medication 1.5 G: at 07:32

## 2018-09-26 ASSESSMENT — PAIN DESCRIPTION - PROGRESSION: CLINICAL_PROGRESSION: NOT CHANGED

## 2018-09-26 ASSESSMENT — PAIN DESCRIPTION - ONSET: ONSET: ON-GOING

## 2018-09-26 ASSESSMENT — PAIN DESCRIPTION - PAIN TYPE: TYPE: ACUTE PAIN

## 2018-09-26 ASSESSMENT — PAIN SCALES - GENERAL: PAINLEVEL_OUTOF10: 9

## 2018-09-26 ASSESSMENT — PAIN DESCRIPTION - ORIENTATION: ORIENTATION: RIGHT

## 2018-09-26 ASSESSMENT — PAIN DESCRIPTION - FREQUENCY: FREQUENCY: INTERMITTENT

## 2018-09-26 ASSESSMENT — PAIN DESCRIPTION - DESCRIPTORS: DESCRIPTORS: ACHING

## 2018-09-26 ASSESSMENT — PAIN DESCRIPTION - LOCATION: LOCATION: LEG

## 2018-09-26 NOTE — PROGRESS NOTES
Patient able to void >350 mL without difficulty. No complaints of blood, burning, or staining. Patients fiance is at bedside to assist with dressing and will transport patient to home. Discharge instructions to be complete with appropriate documentation signed.

## 2018-09-26 NOTE — PROGRESS NOTES
Singh catheter removed with patient encouraged to drink liquids to ensure appropriate voiding before discharged to home. RN to monitor output.

## 2018-09-26 NOTE — PROGRESS NOTES
Dr. Jesus Murguia at bedside for assessment and review of POC. Verbal orders to hold IV lasix as patients creatinine is elevated at 1.3. Edema to BLE is greatly improved with cellulitis resolving. IV antibiotics also DC'd. Discharge orders acknowledged with patient to follow up outpatient with gynecology for continued infrequent vaginal bleeding and GI for routine colonoscopy. See physician notes for details of assessment.

## 2018-09-26 NOTE — PROGRESS NOTES
Pharmacy Vancomycin Consult     Vancomycin Day: 10  Current Dosin.5gm q12h    Temp max:  98.4    Recent Labs      18   0554  18   0545   BUN  9  10       Recent Labs      18   0554  18   0545   CREATININE  1.1  1.3*       Recent Labs      18   0539  18   0545   WBC  10.0  9.7         Intake/Output Summary (Last 24 hours) at 18 1010  Last data filed at 18 0850   Gross per 24 hour   Intake 840 ml   Output 4350 ml   Net -3510 ml       Culture Date      Source                       Results  NGTD    Ht Readings from Last 1 Encounters:   18 5' 3\" (1.6 m)        Wt Readings from Last 1 Encounters:   18 (!) 360 lb 6.4 oz (163.5 kg)         Body mass index is 63.84 kg/m². Estimated Creatinine Clearance: 74 mL/min (A) (based on SCr of 1.3 mg/dL (H)).     Trough: 15.8mcg/ml    Assessment/Plan:  Continue with same rx  Christen Gonzales Pharm D [id number] AM  .

## 2018-09-26 NOTE — PROGRESS NOTES
IV and allen removed per Dr. Jesus Murguia since pt is discharging. Pt has been instructed to alert staff when she urinates. Pt states no further needs at this time. Call light within reach. Will continue to monitor.

## 2018-09-26 NOTE — PROGRESS NOTES
Physical Therapy  Attempting PT follow up visit, but pt currently receiving nursing care and speaking with MD. Will plan to follow up with pt later this date. No charge.      Jose G Irving, PT, DPT #[id number]

## 2018-09-26 NOTE — DISCHARGE SUMMARY
offloading.   - Switched to lasix 40 mg q 8. Excellent diuresis  - discontinued IV lasix  - continued PO lasix at d/c, diuresed ~ 13L since admission     Acute kidney injury - resolved  - suspected with sepsis  - monitored closely with IV lasix  - Cr at d/c 1.3     Hyponatremia   - resolved    Hypomagnesemia   - Resolved    Hypokalemia - Resolved  - repleted IV and PO  - monitored with lasix gtt  - 4.1 at d/c     Microcytic anemia   - mixed ACD and iron def on labs earlier this year  - cont'd PO supplement.   - give IV venofer while in house.   - get FOBT. - she denied melena, no tarry stools. Never had colonoscopy. - Also c/o vaginal bleeding  - Needs outpatient colonoscopy and GYN appts  - discharged with Ferrous Sulfate     MIRIAM   Morbid Obesity  - Body mass index is 70.45 kg/m². - Complicating assessment and treatment. Placing patient at risk for multiple co-morbidities as well as early death and contributing to the patient's presentation.   - Counseled on weight loss    Procedures (Please Review Full Report for Details)  N/A    Consults    N/A    Physical Exam at Discharge:    BP (!) 141/90   Pulse 72   Temp 97.4 °F (36.3 °C) (Oral)   Resp 16   Ht 5' 3\" (1.6 m)   Wt (!) 360 lb 6.4 oz (163.5 kg)   LMP 09/10/2018   SpO2 96%   BMI 63.84 kg/m²   General: elderly female morbidly obese,   Awake, alert and oriented. Mucous Membranes:  Pink , anicteric  Neck: No JVD, no carotid bruit, no thyromegaly  Chest:  Clear to auscultation bilaterally, occasional wheeze  Cardiovascular:  RRR S1S2 heard, no murmurs or gallops  Abdomen:  Soft, morbidly obese,  undistended, non tender, no organomegaly, BS present  Extremities:diffuse ariella pedal edema , abd wall edema with large pannus and intertriginous dermatitis . improving redeness and warmth to right leg but edema remains  2+ edema ariella .  Distal pulses well felt  Neurological : grossly normal     CBC:   Recent Labs      09/24/18   0539  09/26/18   0545   WBC  10.0 Discharged in stable condition to home. Follow Up: Follow up with PCP in 1 week. Total time spent on discharge is 35 minutes    FRANCISCO Hermosillo.

## 2018-09-30 LAB — OCCULT BLOOD DIAGNOSTIC: NORMAL
